# Patient Record
Sex: FEMALE | Race: WHITE | HISPANIC OR LATINO
[De-identification: names, ages, dates, MRNs, and addresses within clinical notes are randomized per-mention and may not be internally consistent; named-entity substitution may affect disease eponyms.]

---

## 2022-04-19 PROBLEM — Z00.00 ENCOUNTER FOR PREVENTIVE HEALTH EXAMINATION: Status: ACTIVE | Noted: 2022-04-19

## 2022-05-10 ENCOUNTER — APPOINTMENT (OUTPATIENT)
Dept: ANTEPARTUM | Facility: CLINIC | Age: 27
End: 2022-05-10
Payer: SELF-PAY

## 2022-05-10 ENCOUNTER — ASOB RESULT (OUTPATIENT)
Age: 27
End: 2022-05-10

## 2022-05-10 PROCEDURE — 76817 TRANSVAGINAL US OBSTETRIC: CPT

## 2022-05-10 PROCEDURE — 76811 OB US DETAILED SNGL FETUS: CPT

## 2022-06-16 ENCOUNTER — APPOINTMENT (OUTPATIENT)
Dept: PEDIATRIC CARDIOLOGY | Facility: CLINIC | Age: 27
End: 2022-06-16

## 2022-07-18 ENCOUNTER — NON-APPOINTMENT (OUTPATIENT)
Age: 27
End: 2022-07-18

## 2022-07-18 ENCOUNTER — ASOB RESULT (OUTPATIENT)
Age: 27
End: 2022-07-18

## 2022-07-18 ENCOUNTER — APPOINTMENT (OUTPATIENT)
Dept: ANTEPARTUM | Facility: CLINIC | Age: 27
End: 2022-07-18

## 2022-07-18 PROCEDURE — 76816 OB US FOLLOW-UP PER FETUS: CPT

## 2022-07-20 ENCOUNTER — APPOINTMENT (OUTPATIENT)
Dept: ANTEPARTUM | Facility: CLINIC | Age: 27
End: 2022-07-20

## 2022-08-29 ENCOUNTER — APPOINTMENT (OUTPATIENT)
Dept: ANTEPARTUM | Facility: CLINIC | Age: 27
End: 2022-08-29

## 2022-09-25 ENCOUNTER — INPATIENT (INPATIENT)
Facility: HOSPITAL | Age: 27
LOS: 5 days | Discharge: HOME | End: 2022-10-01
Attending: OBSTETRICS & GYNECOLOGY | Admitting: OBSTETRICS & GYNECOLOGY
Payer: COMMERCIAL

## 2022-09-25 VITALS — HEIGHT: 60 IN | WEIGHT: 179.9 LBS

## 2022-09-25 DIAGNOSIS — O26.899 OTHER SPECIFIED PREGNANCY RELATED CONDITIONS, UNSPECIFIED TRIMESTER: ICD-10-CM

## 2022-09-25 DIAGNOSIS — Z3A.00 WEEKS OF GESTATION OF PREGNANCY NOT SPECIFIED: ICD-10-CM

## 2022-09-25 LAB
BASOPHILS # BLD AUTO: 0.04 K/UL — SIGNIFICANT CHANGE UP (ref 0–0.2)
BASOPHILS NFR BLD AUTO: 0.3 % — SIGNIFICANT CHANGE UP (ref 0–2)
EOSINOPHIL # BLD AUTO: 0.08 K/UL — SIGNIFICANT CHANGE UP (ref 0–0.5)
EOSINOPHIL NFR BLD AUTO: 0.5 % — SIGNIFICANT CHANGE UP (ref 0–6)
HCT VFR BLD CALC: 37.1 % — SIGNIFICANT CHANGE UP (ref 34.5–45)
HGB BLD-MCNC: 12.7 G/DL — SIGNIFICANT CHANGE UP (ref 11.5–15.5)
IMM GRANULOCYTES NFR BLD AUTO: 0.7 % — SIGNIFICANT CHANGE UP (ref 0–0.9)
LYMPHOCYTES # BLD AUTO: 1.79 K/UL — SIGNIFICANT CHANGE UP (ref 1–3.3)
LYMPHOCYTES # BLD AUTO: 12.1 % — LOW (ref 13–44)
MCHC RBC-ENTMCNC: 30.9 PG — SIGNIFICANT CHANGE UP (ref 27–34)
MCHC RBC-ENTMCNC: 34.2 GM/DL — SIGNIFICANT CHANGE UP (ref 32–36)
MCV RBC AUTO: 90.3 FL — SIGNIFICANT CHANGE UP (ref 80–100)
MONOCYTES # BLD AUTO: 1.16 K/UL — HIGH (ref 0–0.9)
MONOCYTES NFR BLD AUTO: 7.8 % — SIGNIFICANT CHANGE UP (ref 2–14)
NEUTROPHILS # BLD AUTO: 11.66 K/UL — HIGH (ref 1.8–7.4)
NEUTROPHILS NFR BLD AUTO: 78.6 % — HIGH (ref 43–77)
PLATELET # BLD AUTO: 218 K/UL — SIGNIFICANT CHANGE UP (ref 150–400)
RBC # BLD: 4.11 M/UL — SIGNIFICANT CHANGE UP (ref 3.8–5.2)
RBC # FLD: 13.8 % — SIGNIFICANT CHANGE UP (ref 10.3–14.5)
WBC # BLD: 14.83 K/UL — HIGH (ref 3.8–10.5)
WBC # FLD AUTO: 14.83 K/UL — HIGH (ref 3.8–10.5)

## 2022-09-25 PROCEDURE — 94760 N-INVAS EAR/PLS OXIMETRY 1: CPT

## 2022-09-25 PROCEDURE — 36415 COLL VENOUS BLD VENIPUNCTURE: CPT

## 2022-09-25 PROCEDURE — 80053 COMPREHEN METABOLIC PANEL: CPT

## 2022-09-25 PROCEDURE — U0005: CPT

## 2022-09-25 PROCEDURE — 85027 COMPLETE CBC AUTOMATED: CPT

## 2022-09-25 PROCEDURE — 87040 BLOOD CULTURE FOR BACTERIA: CPT

## 2022-09-25 PROCEDURE — U0003: CPT

## 2022-09-25 PROCEDURE — 86780 TREPONEMA PALLIDUM: CPT

## 2022-09-25 PROCEDURE — 87150 DNA/RNA AMPLIFIED PROBE: CPT

## 2022-09-25 PROCEDURE — 87186 SC STD MICRODIL/AGAR DIL: CPT

## 2022-09-25 PROCEDURE — 86901 BLOOD TYPING SEROLOGIC RH(D): CPT

## 2022-09-25 PROCEDURE — 87086 URINE CULTURE/COLONY COUNT: CPT

## 2022-09-25 PROCEDURE — 85025 COMPLETE CBC W/AUTO DIFF WBC: CPT

## 2022-09-25 PROCEDURE — 86900 BLOOD TYPING SEROLOGIC ABO: CPT

## 2022-09-25 PROCEDURE — 59050 FETAL MONITOR W/REPORT: CPT

## 2022-09-25 PROCEDURE — 71045 X-RAY EXAM CHEST 1 VIEW: CPT

## 2022-09-25 PROCEDURE — C1889: CPT

## 2022-09-25 PROCEDURE — 86850 RBC ANTIBODY SCREEN: CPT

## 2022-09-25 PROCEDURE — 99214 OFFICE O/P EST MOD 30 MIN: CPT

## 2022-09-25 PROCEDURE — 83605 ASSAY OF LACTIC ACID: CPT

## 2022-09-25 PROCEDURE — 86769 SARS-COV-2 COVID-19 ANTIBODY: CPT

## 2022-09-25 RX ORDER — CITRIC ACID/SODIUM CITRATE 300-500 MG
30 SOLUTION, ORAL ORAL ONCE
Refills: 0 | Status: DISCONTINUED | OUTPATIENT
Start: 2022-09-25 | End: 2022-09-26

## 2022-09-25 RX ORDER — OXYTOCIN 10 UNIT/ML
VIAL (ML) INJECTION
Qty: 30 | Refills: 0 | Status: DISCONTINUED | OUTPATIENT
Start: 2022-09-25 | End: 2022-09-26

## 2022-09-25 RX ORDER — OXYTOCIN 10 UNIT/ML
333.33 VIAL (ML) INJECTION
Qty: 20 | Refills: 0 | Status: COMPLETED | OUTPATIENT
Start: 2022-09-25 | End: 2022-09-26

## 2022-09-25 RX ORDER — CHLORHEXIDINE GLUCONATE 213 G/1000ML
1 SOLUTION TOPICAL ONCE
Refills: 0 | Status: DISCONTINUED | OUTPATIENT
Start: 2022-09-25 | End: 2022-09-26

## 2022-09-25 RX ORDER — SODIUM CHLORIDE 9 MG/ML
1000 INJECTION, SOLUTION INTRAVENOUS
Refills: 0 | Status: DISCONTINUED | OUTPATIENT
Start: 2022-09-25 | End: 2022-09-26

## 2022-09-25 RX ADMIN — SODIUM CHLORIDE 125 MILLILITER(S): 9 INJECTION, SOLUTION INTRAVENOUS at 23:30

## 2022-09-25 RX ADMIN — SODIUM CHLORIDE 125 MILLILITER(S): 9 INJECTION, SOLUTION INTRAVENOUS at 23:28

## 2022-09-25 NOTE — PATIENT PROFILE OB - NS PRO TDAP RECEIVED Y/N
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 \par \par She reports chest pain associated with exertion almost daily. She states she would have chest pain soon after walking for one minutes; described the chest pain like a bandage around the chest. She states the SL nitroglycerin relieved the chest pain within one minutes; described the relief as the releasing a knot. She also reports SOB sometimes. She states the chest pain has bothered her daily life and has restricted her physical activity and daily lives.  \par \par Cardiac cath history:\par - 2014 --> s/p PTCA/TOMASZ to LAD\par - 2015 --> ISR of pLAD s/p TOMASZ with resolute stent\par - 2016 --> ISR of pLAD s/p PTCA/TOMASZ, as well as PTCA/TOMASZ to D1\par - 2016 --> PTCA of Cx. On this angiogram patient noted to have patent LM, pLAD, and pD1\par -2020 --> patent stents in dLM, pLAD,ostial D1 \par \par 3/2019- Modified Jordan stress test- 12:30 minutes- atypical left should pain, unchanged with exercise.\par 
no...

## 2022-09-25 NOTE — PATIENT PROFILE OB - FALL HARM RISK - UNIVERSAL INTERVENTIONS
Bed in lowest position, wheels locked, appropriate side rails in place/Call bell, personal items and telephone in reach/Instruct patient to call for assistance before getting out of bed or chair/Non-slip footwear when patient is out of bed/[place] to call system/Physically safe environment - no spills, clutter or unnecessary equipment/Purposeful Proactive Rounding/Room/bathroom lighting operational, light cord in reach

## 2022-09-26 LAB
ALBUMIN SERPL ELPH-MCNC: 1.9 G/DL — LOW (ref 3.3–5)
ALP SERPL-CCNC: 152 U/L — HIGH (ref 40–120)
ALT FLD-CCNC: 13 U/L — SIGNIFICANT CHANGE UP (ref 12–78)
ANION GAP SERPL CALC-SCNC: 6 MMOL/L — SIGNIFICANT CHANGE UP (ref 5–17)
AST SERPL-CCNC: 22 U/L — SIGNIFICANT CHANGE UP (ref 15–37)
BILIRUB SERPL-MCNC: 0.6 MG/DL — SIGNIFICANT CHANGE UP (ref 0.2–1.2)
BUN SERPL-MCNC: 7 MG/DL — SIGNIFICANT CHANGE UP (ref 7–23)
CALCIUM SERPL-MCNC: 8.2 MG/DL — LOW (ref 8.5–10.1)
CHLORIDE SERPL-SCNC: 108 MMOL/L — SIGNIFICANT CHANGE UP (ref 96–108)
CO2 SERPL-SCNC: 23 MMOL/L — SIGNIFICANT CHANGE UP (ref 22–31)
COVID-19 SPIKE DOMAIN AB INTERP: POSITIVE
COVID-19 SPIKE DOMAIN ANTIBODY RESULT: >250 U/ML — HIGH
CREAT SERPL-MCNC: 0.56 MG/DL — SIGNIFICANT CHANGE UP (ref 0.5–1.3)
EGFR: 127 ML/MIN/1.73M2 — SIGNIFICANT CHANGE UP
GLUCOSE SERPL-MCNC: 97 MG/DL — SIGNIFICANT CHANGE UP (ref 70–99)
HCT VFR BLD CALC: 29.9 % — LOW (ref 34.5–45)
HGB BLD-MCNC: 10.1 G/DL — LOW (ref 11.5–15.5)
LACTATE SERPL-SCNC: 1.9 MMOL/L — SIGNIFICANT CHANGE UP (ref 0.7–2)
MCHC RBC-ENTMCNC: 31.3 PG — SIGNIFICANT CHANGE UP (ref 27–34)
MCHC RBC-ENTMCNC: 33.8 GM/DL — SIGNIFICANT CHANGE UP (ref 32–36)
MCV RBC AUTO: 92.6 FL — SIGNIFICANT CHANGE UP (ref 80–100)
PLATELET # BLD AUTO: 176 K/UL — SIGNIFICANT CHANGE UP (ref 150–400)
POTASSIUM SERPL-MCNC: 3.4 MMOL/L — LOW (ref 3.5–5.3)
POTASSIUM SERPL-SCNC: 3.4 MMOL/L — LOW (ref 3.5–5.3)
PROT SERPL-MCNC: 4.8 GM/DL — LOW (ref 6–8.3)
RBC # BLD: 3.23 M/UL — LOW (ref 3.8–5.2)
RBC # FLD: 14.1 % — SIGNIFICANT CHANGE UP (ref 10.3–14.5)
SARS-COV-2 IGG+IGM SERPL QL IA: >250 U/ML — HIGH
SARS-COV-2 IGG+IGM SERPL QL IA: POSITIVE
SARS-COV-2 RNA SPEC QL NAA+PROBE: SIGNIFICANT CHANGE UP
SODIUM SERPL-SCNC: 137 MMOL/L — SIGNIFICANT CHANGE UP (ref 135–145)
T PALLIDUM AB TITR SER: NEGATIVE — SIGNIFICANT CHANGE UP
WBC # BLD: 28.84 K/UL — HIGH (ref 3.8–10.5)
WBC # FLD AUTO: 28.84 K/UL — HIGH (ref 3.8–10.5)

## 2022-09-26 PROCEDURE — 71045 X-RAY EXAM CHEST 1 VIEW: CPT | Mod: 26

## 2022-09-26 RX ORDER — MAGNESIUM HYDROXIDE 400 MG/1
30 TABLET, CHEWABLE ORAL
Refills: 0 | Status: DISCONTINUED | OUTPATIENT
Start: 2022-09-26 | End: 2022-10-01

## 2022-09-26 RX ORDER — ACETAMINOPHEN 500 MG
975 TABLET ORAL
Refills: 0 | Status: DISCONTINUED | OUTPATIENT
Start: 2022-09-26 | End: 2022-10-01

## 2022-09-26 RX ORDER — CARBOPROST TROMETHAMINE 250 UG/ML
0.25 INJECTION, SOLUTION INTRAMUSCULAR ONCE
Refills: 0 | Status: COMPLETED | OUTPATIENT
Start: 2022-09-26 | End: 2022-09-26

## 2022-09-26 RX ORDER — OXYTOCIN 10 UNIT/ML
333.33 VIAL (ML) INJECTION
Qty: 20 | Refills: 0 | Status: DISCONTINUED | OUTPATIENT
Start: 2022-09-26 | End: 2022-10-01

## 2022-09-26 RX ORDER — OXYCODONE HYDROCHLORIDE 5 MG/1
5 TABLET ORAL ONCE
Refills: 0 | Status: DISCONTINUED | OUTPATIENT
Start: 2022-09-26 | End: 2022-10-01

## 2022-09-26 RX ORDER — DIPHENHYDRAMINE HCL 50 MG
25 CAPSULE ORAL EVERY 6 HOURS
Refills: 0 | Status: DISCONTINUED | OUTPATIENT
Start: 2022-09-26 | End: 2022-10-01

## 2022-09-26 RX ORDER — AZITHROMYCIN 500 MG/1
500 TABLET, FILM COATED ORAL ONCE
Refills: 0 | Status: COMPLETED | OUTPATIENT
Start: 2022-09-26 | End: 2022-09-26

## 2022-09-26 RX ORDER — OXYCODONE HYDROCHLORIDE 5 MG/1
5 TABLET ORAL
Refills: 0 | Status: DISCONTINUED | OUTPATIENT
Start: 2022-09-26 | End: 2022-10-01

## 2022-09-26 RX ORDER — IBUPROFEN 200 MG
600 TABLET ORAL EVERY 6 HOURS
Refills: 0 | Status: DISCONTINUED | OUTPATIENT
Start: 2022-09-26 | End: 2022-10-01

## 2022-09-26 RX ORDER — MORPHINE SULFATE 50 MG/1
3 CAPSULE, EXTENDED RELEASE ORAL ONCE
Refills: 0 | Status: DISCONTINUED | OUTPATIENT
Start: 2022-09-26 | End: 2022-10-01

## 2022-09-26 RX ORDER — KETOROLAC TROMETHAMINE 30 MG/ML
30 SYRINGE (ML) INJECTION EVERY 6 HOURS
Refills: 0 | Status: DISCONTINUED | OUTPATIENT
Start: 2022-09-26 | End: 2022-09-27

## 2022-09-26 RX ORDER — GENTAMICIN SULFATE 40 MG/ML
300 VIAL (ML) INJECTION ONCE
Refills: 0 | Status: COMPLETED | OUTPATIENT
Start: 2022-09-26 | End: 2022-09-26

## 2022-09-26 RX ORDER — LANOLIN
1 OINTMENT (GRAM) TOPICAL EVERY 6 HOURS
Refills: 0 | Status: DISCONTINUED | OUTPATIENT
Start: 2022-09-26 | End: 2022-10-01

## 2022-09-26 RX ORDER — ONDANSETRON 8 MG/1
4 TABLET, FILM COATED ORAL EVERY 6 HOURS
Refills: 0 | Status: DISCONTINUED | OUTPATIENT
Start: 2022-09-26 | End: 2022-10-01

## 2022-09-26 RX ORDER — GENTAMICIN SULFATE 40 MG/ML
300 VIAL (ML) INJECTION ONCE
Refills: 0 | Status: DISCONTINUED | OUTPATIENT
Start: 2022-09-26 | End: 2022-09-26

## 2022-09-26 RX ORDER — TETANUS TOXOID, REDUCED DIPHTHERIA TOXOID AND ACELLULAR PERTUSSIS VACCINE, ADSORBED 5; 2.5; 8; 8; 2.5 [IU]/.5ML; [IU]/.5ML; UG/.5ML; UG/.5ML; UG/.5ML
0.5 SUSPENSION INTRAMUSCULAR ONCE
Refills: 0 | Status: DISCONTINUED | OUTPATIENT
Start: 2022-09-26 | End: 2022-10-01

## 2022-09-26 RX ORDER — POTASSIUM CHLORIDE 20 MEQ
20 PACKET (EA) ORAL
Refills: 0 | Status: COMPLETED | OUTPATIENT
Start: 2022-09-26 | End: 2022-09-27

## 2022-09-26 RX ORDER — ENOXAPARIN SODIUM 100 MG/ML
40 INJECTION SUBCUTANEOUS EVERY 24 HOURS
Refills: 0 | Status: DISCONTINUED | OUTPATIENT
Start: 2022-09-27 | End: 2022-10-01

## 2022-09-26 RX ORDER — NALOXONE HYDROCHLORIDE 4 MG/.1ML
0.1 SPRAY NASAL
Refills: 0 | Status: DISCONTINUED | OUTPATIENT
Start: 2022-09-26 | End: 2022-10-01

## 2022-09-26 RX ORDER — DIPHENOXYLATE HCL/ATROPINE 2.5-.025MG
1 TABLET ORAL ONCE
Refills: 0 | Status: DISCONTINUED | OUTPATIENT
Start: 2022-09-26 | End: 2022-09-26

## 2022-09-26 RX ORDER — OXYCODONE HYDROCHLORIDE 5 MG/1
10 TABLET ORAL
Refills: 0 | Status: DISCONTINUED | OUTPATIENT
Start: 2022-09-26 | End: 2022-10-01

## 2022-09-26 RX ORDER — SODIUM CHLORIDE 9 MG/ML
1000 INJECTION, SOLUTION INTRAVENOUS
Refills: 0 | Status: DISCONTINUED | OUTPATIENT
Start: 2022-09-26 | End: 2022-10-01

## 2022-09-26 RX ORDER — HYDROMORPHONE HYDROCHLORIDE 2 MG/ML
1 INJECTION INTRAMUSCULAR; INTRAVENOUS; SUBCUTANEOUS
Refills: 0 | Status: DISCONTINUED | OUTPATIENT
Start: 2022-09-26 | End: 2022-10-01

## 2022-09-26 RX ORDER — ACETAMINOPHEN 500 MG
1000 TABLET ORAL ONCE
Refills: 0 | Status: COMPLETED | OUTPATIENT
Start: 2022-09-26 | End: 2022-09-26

## 2022-09-26 RX ORDER — IBUPROFEN 200 MG
600 TABLET ORAL EVERY 6 HOURS
Refills: 0 | Status: COMPLETED | OUTPATIENT
Start: 2022-09-26 | End: 2023-08-25

## 2022-09-26 RX ORDER — SIMETHICONE 80 MG/1
80 TABLET, CHEWABLE ORAL EVERY 4 HOURS
Refills: 0 | Status: DISCONTINUED | OUTPATIENT
Start: 2022-09-26 | End: 2022-10-01

## 2022-09-26 RX ORDER — INFLUENZA VIRUS VACCINE 15; 15; 15; 15 UG/.5ML; UG/.5ML; UG/.5ML; UG/.5ML
0.5 SUSPENSION INTRAMUSCULAR ONCE
Refills: 0 | Status: DISCONTINUED | OUTPATIENT
Start: 2022-09-26 | End: 2022-10-01

## 2022-09-26 RX ORDER — GENTAMICIN SULFATE 40 MG/ML
300 VIAL (ML) INJECTION ONCE
Refills: 0 | Status: COMPLETED | OUTPATIENT
Start: 2022-09-27 | End: 2022-09-27

## 2022-09-26 RX ADMIN — Medication 1000 MILLIUNIT(S)/MIN: at 12:12

## 2022-09-26 RX ADMIN — Medication 20 MILLIEQUIVALENT(S): at 21:09

## 2022-09-26 RX ADMIN — Medication 2 MILLIUNIT(S)/MIN: at 08:46

## 2022-09-26 RX ADMIN — Medication 400 MILLIGRAM(S): at 12:00

## 2022-09-26 RX ADMIN — Medication 20 MILLIEQUIVALENT(S): at 22:49

## 2022-09-26 RX ADMIN — Medication 0.2 MILLIGRAM(S): at 18:44

## 2022-09-26 RX ADMIN — Medication 1 TABLET(S): at 11:23

## 2022-09-26 RX ADMIN — Medication 100 MILLIGRAM(S): at 18:13

## 2022-09-26 RX ADMIN — Medication 975 MILLIGRAM(S): at 21:35

## 2022-09-26 RX ADMIN — SODIUM CHLORIDE 125 MILLILITER(S): 9 INJECTION, SOLUTION INTRAVENOUS at 00:02

## 2022-09-26 RX ADMIN — SODIUM CHLORIDE 125 MILLILITER(S): 9 INJECTION, SOLUTION INTRAVENOUS at 07:02

## 2022-09-26 RX ADMIN — AZITHROMYCIN 255 MILLIGRAM(S): 500 TABLET, FILM COATED ORAL at 10:15

## 2022-09-26 RX ADMIN — Medication 1000 MILLIGRAM(S): at 12:21

## 2022-09-26 RX ADMIN — Medication 30 MILLIGRAM(S): at 18:12

## 2022-09-26 RX ADMIN — Medication 100 MILLIGRAM(S): at 12:00

## 2022-09-26 RX ADMIN — CARBOPROST TROMETHAMINE 0.25 MICROGRAM(S): 250 INJECTION, SOLUTION INTRAMUSCULAR at 11:22

## 2022-09-26 RX ADMIN — Medication 0.2 MILLIGRAM(S): at 11:16

## 2022-09-26 RX ADMIN — SODIUM CHLORIDE 125 MILLILITER(S): 9 INJECTION, SOLUTION INTRAVENOUS at 03:02

## 2022-09-26 RX ADMIN — Medication 100 MILLIGRAM(S): at 10:15

## 2022-09-26 RX ADMIN — Medication 975 MILLIGRAM(S): at 21:08

## 2022-09-26 NOTE — OB POSTPARTUM EVENT NOTE - NS_EVENTPTSUMMARY1_OBGYN_ALL_OB_FT
spO2 88% on room air. T 39.3, HR 97, /74, RR 22. lung sounds clear bilaterally throughout lung field. pt denies chest pain & SOB.

## 2022-09-26 NOTE — OB POSTPARTUM EVENT NOTE - NS_EVENTFINDINGS1_OBGYN_ALL_OB_FT
pt to have CXR, blood cultures, urine culture, lactate. pt put on 3L O2 via nasal cannula. pulse ox to be monitored continuously

## 2022-09-27 LAB
ALBUMIN SERPL ELPH-MCNC: 1.9 G/DL — LOW (ref 3.3–5)
ALP SERPL-CCNC: 136 U/L — HIGH (ref 40–120)
ALT FLD-CCNC: 15 U/L — SIGNIFICANT CHANGE UP (ref 12–78)
ANION GAP SERPL CALC-SCNC: 6 MMOL/L — SIGNIFICANT CHANGE UP (ref 5–17)
AST SERPL-CCNC: 28 U/L — SIGNIFICANT CHANGE UP (ref 15–37)
BASOPHILS # BLD AUTO: 0.05 K/UL — SIGNIFICANT CHANGE UP (ref 0–0.2)
BASOPHILS NFR BLD AUTO: 0.2 % — SIGNIFICANT CHANGE UP (ref 0–2)
BILIRUB SERPL-MCNC: 0.4 MG/DL — SIGNIFICANT CHANGE UP (ref 0.2–1.2)
BUN SERPL-MCNC: 9 MG/DL — SIGNIFICANT CHANGE UP (ref 7–23)
CALCIUM SERPL-MCNC: 8.5 MG/DL — SIGNIFICANT CHANGE UP (ref 8.5–10.1)
CHLORIDE SERPL-SCNC: 109 MMOL/L — HIGH (ref 96–108)
CO2 SERPL-SCNC: 24 MMOL/L — SIGNIFICANT CHANGE UP (ref 22–31)
COVID-19 SPIKE DOMAIN AB INTERP: POSITIVE
COVID-19 SPIKE DOMAIN ANTIBODY RESULT: >250 U/ML — HIGH
CREAT SERPL-MCNC: 0.49 MG/DL — LOW (ref 0.5–1.3)
EGFR: 131 ML/MIN/1.73M2 — SIGNIFICANT CHANGE UP
EOSINOPHIL # BLD AUTO: 0.13 K/UL — SIGNIFICANT CHANGE UP (ref 0–0.5)
EOSINOPHIL NFR BLD AUTO: 0.5 % — SIGNIFICANT CHANGE UP (ref 0–6)
GLUCOSE SERPL-MCNC: 59 MG/DL — LOW (ref 70–99)
HCT VFR BLD CALC: 28.2 % — LOW (ref 34.5–45)
HGB BLD-MCNC: 9.4 G/DL — LOW (ref 11.5–15.5)
IMM GRANULOCYTES NFR BLD AUTO: 1.3 % — HIGH (ref 0–0.9)
LACTATE SERPL-SCNC: 0.8 MMOL/L — SIGNIFICANT CHANGE UP (ref 0.7–2)
LYMPHOCYTES # BLD AUTO: 1.48 K/UL — SIGNIFICANT CHANGE UP (ref 1–3.3)
LYMPHOCYTES # BLD AUTO: 6.2 % — LOW (ref 13–44)
MCHC RBC-ENTMCNC: 31.3 PG — SIGNIFICANT CHANGE UP (ref 27–34)
MCHC RBC-ENTMCNC: 33.3 GM/DL — SIGNIFICANT CHANGE UP (ref 32–36)
MCV RBC AUTO: 94 FL — SIGNIFICANT CHANGE UP (ref 80–100)
MONOCYTES # BLD AUTO: 2.15 K/UL — HIGH (ref 0–0.9)
MONOCYTES NFR BLD AUTO: 9 % — SIGNIFICANT CHANGE UP (ref 2–14)
NEUTROPHILS # BLD AUTO: 19.81 K/UL — HIGH (ref 1.8–7.4)
NEUTROPHILS NFR BLD AUTO: 82.8 % — HIGH (ref 43–77)
PLATELET # BLD AUTO: 178 K/UL — SIGNIFICANT CHANGE UP (ref 150–400)
POTASSIUM SERPL-MCNC: 4.2 MMOL/L — SIGNIFICANT CHANGE UP (ref 3.5–5.3)
POTASSIUM SERPL-SCNC: 4.2 MMOL/L — SIGNIFICANT CHANGE UP (ref 3.5–5.3)
PROT SERPL-MCNC: 5.1 GM/DL — LOW (ref 6–8.3)
RBC # BLD: 3 M/UL — LOW (ref 3.8–5.2)
RBC # FLD: 14.4 % — SIGNIFICANT CHANGE UP (ref 10.3–14.5)
SARS-COV-2 IGG+IGM SERPL QL IA: >250 U/ML — HIGH
SARS-COV-2 IGG+IGM SERPL QL IA: POSITIVE
SODIUM SERPL-SCNC: 139 MMOL/L — SIGNIFICANT CHANGE UP (ref 135–145)
WBC # BLD: 23.94 K/UL — HIGH (ref 3.8–10.5)
WBC # FLD AUTO: 23.94 K/UL — HIGH (ref 3.8–10.5)

## 2022-09-27 RX ADMIN — MAGNESIUM HYDROXIDE 30 MILLILITER(S): 400 TABLET, CHEWABLE ORAL at 22:18

## 2022-09-27 RX ADMIN — Medication 975 MILLIGRAM(S): at 23:00

## 2022-09-27 RX ADMIN — Medication 600 MILLIGRAM(S): at 06:29

## 2022-09-27 RX ADMIN — Medication 1 TABLET(S): at 22:13

## 2022-09-27 RX ADMIN — SIMETHICONE 80 MILLIGRAM(S): 80 TABLET, CHEWABLE ORAL at 12:10

## 2022-09-27 RX ADMIN — Medication 975 MILLIGRAM(S): at 16:10

## 2022-09-27 RX ADMIN — Medication 0.2 MILLIGRAM(S): at 02:47

## 2022-09-27 RX ADMIN — Medication 100 MILLIGRAM(S): at 02:48

## 2022-09-27 RX ADMIN — Medication 975 MILLIGRAM(S): at 09:25

## 2022-09-27 RX ADMIN — SIMETHICONE 80 MILLIGRAM(S): 80 TABLET, CHEWABLE ORAL at 22:18

## 2022-09-27 RX ADMIN — Medication 100 MILLIGRAM(S): at 02:26

## 2022-09-27 RX ADMIN — Medication 0.2 MILLIGRAM(S): at 10:52

## 2022-09-27 RX ADMIN — Medication 600 MILLIGRAM(S): at 18:17

## 2022-09-27 RX ADMIN — Medication 600 MILLIGRAM(S): at 00:13

## 2022-09-27 RX ADMIN — Medication 975 MILLIGRAM(S): at 09:50

## 2022-09-27 RX ADMIN — Medication 100 MILLIGRAM(S): at 12:09

## 2022-09-27 RX ADMIN — Medication 600 MILLIGRAM(S): at 07:00

## 2022-09-27 RX ADMIN — Medication 975 MILLIGRAM(S): at 03:18

## 2022-09-27 RX ADMIN — Medication 20 MILLIEQUIVALENT(S): at 00:38

## 2022-09-27 RX ADMIN — Medication 975 MILLIGRAM(S): at 02:47

## 2022-09-27 RX ADMIN — Medication 600 MILLIGRAM(S): at 12:10

## 2022-09-27 RX ADMIN — Medication 975 MILLIGRAM(S): at 15:19

## 2022-09-27 RX ADMIN — Medication 600 MILLIGRAM(S): at 12:25

## 2022-09-27 RX ADMIN — Medication 975 MILLIGRAM(S): at 22:13

## 2022-09-27 RX ADMIN — Medication 600 MILLIGRAM(S): at 00:42

## 2022-09-27 RX ADMIN — ENOXAPARIN SODIUM 40 MILLIGRAM(S): 100 INJECTION SUBCUTANEOUS at 12:10

## 2022-09-28 LAB
-  AMIKACIN: SIGNIFICANT CHANGE UP
-  AMOXICILLIN/CLAVULANIC ACID: SIGNIFICANT CHANGE UP
-  AMPICILLIN/SULBACTAM: SIGNIFICANT CHANGE UP
-  AMPICILLIN: SIGNIFICANT CHANGE UP
-  AZTREONAM: SIGNIFICANT CHANGE UP
-  CEFAZOLIN: SIGNIFICANT CHANGE UP
-  CEFEPIME: SIGNIFICANT CHANGE UP
-  CEFOXITIN: SIGNIFICANT CHANGE UP
-  CEFTRIAXONE: SIGNIFICANT CHANGE UP
-  CIPROFLOXACIN: SIGNIFICANT CHANGE UP
-  ERTAPENEM: SIGNIFICANT CHANGE UP
-  GENTAMICIN: SIGNIFICANT CHANGE UP
-  IMIPENEM: SIGNIFICANT CHANGE UP
-  LEVOFLOXACIN: SIGNIFICANT CHANGE UP
-  MEROPENEM: SIGNIFICANT CHANGE UP
-  NITROFURANTOIN: SIGNIFICANT CHANGE UP
-  PIPERACILLIN/TAZOBACTAM: SIGNIFICANT CHANGE UP
-  TIGECYCLINE: SIGNIFICANT CHANGE UP
-  TOBRAMYCIN: SIGNIFICANT CHANGE UP
-  TRIMETHOPRIM/SULFAMETHOXAZOLE: SIGNIFICANT CHANGE UP
CULTURE RESULTS: SIGNIFICANT CHANGE UP
E COLI DNA BLD POS QL NAA+NON-PROBE: SIGNIFICANT CHANGE UP
GRAM STN FLD: SIGNIFICANT CHANGE UP
HCT VFR BLD CALC: 26.1 % — LOW (ref 34.5–45)
HGB BLD-MCNC: 8.6 G/DL — LOW (ref 11.5–15.5)
MCHC RBC-ENTMCNC: 30.5 PG — SIGNIFICANT CHANGE UP (ref 27–34)
MCHC RBC-ENTMCNC: 33 GM/DL — SIGNIFICANT CHANGE UP (ref 32–36)
MCV RBC AUTO: 92.6 FL — SIGNIFICANT CHANGE UP (ref 80–100)
METHOD TYPE: SIGNIFICANT CHANGE UP
METHOD TYPE: SIGNIFICANT CHANGE UP
ORGANISM # SPEC MICROSCOPIC CNT: SIGNIFICANT CHANGE UP
ORGANISM # SPEC MICROSCOPIC CNT: SIGNIFICANT CHANGE UP
PLATELET # BLD AUTO: 193 K/UL — SIGNIFICANT CHANGE UP (ref 150–400)
RBC # BLD: 2.82 M/UL — LOW (ref 3.8–5.2)
RBC # FLD: 14.1 % — SIGNIFICANT CHANGE UP (ref 10.3–14.5)
SPECIMEN SOURCE: SIGNIFICANT CHANGE UP
WBC # BLD: 11.98 K/UL — HIGH (ref 3.8–10.5)
WBC # FLD AUTO: 11.98 K/UL — HIGH (ref 3.8–10.5)

## 2022-09-28 RX ORDER — FERROUS SULFATE 325(65) MG
325 TABLET ORAL
Refills: 0 | Status: DISCONTINUED | OUTPATIENT
Start: 2022-09-28 | End: 2022-10-01

## 2022-09-28 RX ORDER — MAGNESIUM HYDROXIDE 400 MG/1
45 TABLET, CHEWABLE ORAL ONCE
Refills: 0 | Status: COMPLETED | OUTPATIENT
Start: 2022-09-28 | End: 2022-09-28

## 2022-09-28 RX ORDER — NITROFURANTOIN MACROCRYSTAL 50 MG
100 CAPSULE ORAL
Refills: 0 | Status: DISCONTINUED | OUTPATIENT
Start: 2022-09-28 | End: 2022-09-28

## 2022-09-28 RX ORDER — CEFTRIAXONE 500 MG/1
1000 INJECTION, POWDER, FOR SOLUTION INTRAMUSCULAR; INTRAVENOUS EVERY 24 HOURS
Refills: 0 | Status: DISCONTINUED | OUTPATIENT
Start: 2022-09-29 | End: 2022-09-29

## 2022-09-28 RX ORDER — CEFTRIAXONE 500 MG/1
INJECTION, POWDER, FOR SOLUTION INTRAMUSCULAR; INTRAVENOUS
Refills: 0 | Status: DISCONTINUED | OUTPATIENT
Start: 2022-09-28 | End: 2022-09-29

## 2022-09-28 RX ORDER — CEFTRIAXONE 500 MG/1
1000 INJECTION, POWDER, FOR SOLUTION INTRAMUSCULAR; INTRAVENOUS ONCE
Refills: 0 | Status: COMPLETED | OUTPATIENT
Start: 2022-09-28 | End: 2022-09-28

## 2022-09-28 RX ADMIN — Medication 600 MILLIGRAM(S): at 01:00

## 2022-09-28 RX ADMIN — Medication 600 MILLIGRAM(S): at 18:51

## 2022-09-28 RX ADMIN — Medication 600 MILLIGRAM(S): at 17:33

## 2022-09-28 RX ADMIN — MAGNESIUM HYDROXIDE 45 MILLILITER(S): 400 TABLET, CHEWABLE ORAL at 12:09

## 2022-09-28 RX ADMIN — Medication 975 MILLIGRAM(S): at 04:30

## 2022-09-28 RX ADMIN — Medication 975 MILLIGRAM(S): at 03:40

## 2022-09-28 RX ADMIN — Medication 975 MILLIGRAM(S): at 09:17

## 2022-09-28 RX ADMIN — Medication 975 MILLIGRAM(S): at 15:27

## 2022-09-28 RX ADMIN — Medication 1 TABLET(S): at 17:33

## 2022-09-28 RX ADMIN — Medication 600 MILLIGRAM(S): at 07:30

## 2022-09-28 RX ADMIN — Medication 600 MILLIGRAM(S): at 12:10

## 2022-09-28 RX ADMIN — Medication 975 MILLIGRAM(S): at 22:10

## 2022-09-28 RX ADMIN — Medication 1 TABLET(S): at 06:51

## 2022-09-28 RX ADMIN — Medication 975 MILLIGRAM(S): at 16:28

## 2022-09-28 RX ADMIN — CEFTRIAXONE 100 MILLIGRAM(S): 500 INJECTION, POWDER, FOR SOLUTION INTRAMUSCULAR; INTRAVENOUS at 21:40

## 2022-09-28 RX ADMIN — Medication 600 MILLIGRAM(S): at 00:15

## 2022-09-28 RX ADMIN — Medication 600 MILLIGRAM(S): at 12:19

## 2022-09-28 RX ADMIN — Medication 600 MILLIGRAM(S): at 06:51

## 2022-09-28 RX ADMIN — Medication 975 MILLIGRAM(S): at 21:15

## 2022-09-28 RX ADMIN — ENOXAPARIN SODIUM 40 MILLIGRAM(S): 100 INJECTION SUBCUTANEOUS at 12:12

## 2022-09-28 RX ADMIN — Medication 975 MILLIGRAM(S): at 10:58

## 2022-09-28 NOTE — PHARMACOTHERAPY INTERVENTION NOTE - COMMENTS
Recommended escalating from amoxicillin-clavulanate to ceftriaxone 1g IV q24h in the setting of E. coli bacteremia.

## 2022-09-29 LAB
HCT VFR BLD CALC: 26.3 % — LOW (ref 34.5–45)
HGB BLD-MCNC: 8.7 G/DL — LOW (ref 11.5–15.5)
MCHC RBC-ENTMCNC: 30.9 PG — SIGNIFICANT CHANGE UP (ref 27–34)
MCHC RBC-ENTMCNC: 33.1 GM/DL — SIGNIFICANT CHANGE UP (ref 32–36)
MCV RBC AUTO: 93.3 FL — SIGNIFICANT CHANGE UP (ref 80–100)
PLATELET # BLD AUTO: 279 K/UL — SIGNIFICANT CHANGE UP (ref 150–400)
RBC # BLD: 2.82 M/UL — LOW (ref 3.8–5.2)
RBC # FLD: 13.9 % — SIGNIFICANT CHANGE UP (ref 10.3–14.5)
WBC # BLD: 7.57 K/UL — SIGNIFICANT CHANGE UP (ref 3.8–10.5)
WBC # FLD AUTO: 7.57 K/UL — SIGNIFICANT CHANGE UP (ref 3.8–10.5)

## 2022-09-29 RX ORDER — AMPICILLIN SODIUM AND SULBACTAM SODIUM 250; 125 MG/ML; MG/ML
3 INJECTION, POWDER, FOR SUSPENSION INTRAMUSCULAR; INTRAVENOUS EVERY 6 HOURS
Refills: 0 | Status: DISCONTINUED | OUTPATIENT
Start: 2022-09-29 | End: 2022-10-01

## 2022-09-29 RX ORDER — AMPICILLIN SODIUM AND SULBACTAM SODIUM 250; 125 MG/ML; MG/ML
INJECTION, POWDER, FOR SUSPENSION INTRAMUSCULAR; INTRAVENOUS
Refills: 0 | Status: DISCONTINUED | OUTPATIENT
Start: 2022-09-29 | End: 2022-10-01

## 2022-09-29 RX ORDER — AMPICILLIN SODIUM AND SULBACTAM SODIUM 250; 125 MG/ML; MG/ML
3 INJECTION, POWDER, FOR SUSPENSION INTRAMUSCULAR; INTRAVENOUS ONCE
Refills: 0 | Status: COMPLETED | OUTPATIENT
Start: 2022-09-29 | End: 2022-09-29

## 2022-09-29 RX ADMIN — Medication 600 MILLIGRAM(S): at 00:26

## 2022-09-29 RX ADMIN — Medication 600 MILLIGRAM(S): at 01:20

## 2022-09-29 RX ADMIN — Medication 600 MILLIGRAM(S): at 12:25

## 2022-09-29 RX ADMIN — Medication 975 MILLIGRAM(S): at 03:32

## 2022-09-29 RX ADMIN — ENOXAPARIN SODIUM 40 MILLIGRAM(S): 100 INJECTION SUBCUTANEOUS at 16:57

## 2022-09-29 RX ADMIN — Medication 600 MILLIGRAM(S): at 13:15

## 2022-09-29 RX ADMIN — AMPICILLIN SODIUM AND SULBACTAM SODIUM 200 GRAM(S): 250; 125 INJECTION, POWDER, FOR SUSPENSION INTRAMUSCULAR; INTRAVENOUS at 16:55

## 2022-09-29 RX ADMIN — Medication 325 MILLIGRAM(S): at 22:56

## 2022-09-29 RX ADMIN — Medication 325 MILLIGRAM(S): at 12:25

## 2022-09-29 RX ADMIN — Medication 325 MILLIGRAM(S): at 00:26

## 2022-09-29 RX ADMIN — Medication 600 MILLIGRAM(S): at 07:14

## 2022-09-29 RX ADMIN — AMPICILLIN SODIUM AND SULBACTAM SODIUM 200 GRAM(S): 250; 125 INJECTION, POWDER, FOR SUSPENSION INTRAMUSCULAR; INTRAVENOUS at 10:46

## 2022-09-29 RX ADMIN — Medication 975 MILLIGRAM(S): at 04:30

## 2022-09-29 NOTE — CONSULT NOTE ADULT - SUBJECTIVE AND OBJECTIVE BOX
Patient is a 27y old  Female who presents with a chief complaint of delivery    HPI:  26 y/o woman, pregnant was admitted on  for delivery. On  she underwent a  and received clindamycin, gentamicin and azithromycin. She is reported with bacteremia.     PMH: as above  PSH: as above  Meds: per reconciliation sheet, noted below  MEDICATIONS  (STANDING):  acetaminophen     Tablet .. 975 milliGRAM(s) Oral <User Schedule>  cefTRIAXone   IVPB      cefTRIAXone   IVPB 1000 milliGRAM(s) IV Intermittent every 24 hours  dextrose 5% + lactated ringers. 1000 milliLiter(s) (125 mL/Hr) IV Continuous <Continuous>  diphtheria/tetanus/pertussis (acellular) Vaccine (ADAcel) 0.5 milliLiter(s) IntraMuscular once  enoxaparin Injectable 40 milliGRAM(s) SubCutaneous every 24 hours  ferrous    sulfate 325 milliGRAM(s) Oral two times a day  ibuprofen  Tablet. 600 milliGRAM(s) Oral every 6 hours  influenza   Vaccine 0.5 milliLiter(s) IntraMuscular once  lactated ringers. 1000 milliLiter(s) (125 mL/Hr) IV Continuous <Continuous>  measles/mumps/rubella Vaccine 0.5 milliLiter(s) SubCutaneous once  morphine PF Epidural 3 milliGRAM(s) Epidural once  oxytocin Infusion 333.333 milliUNIT(s)/Min (1000 mL/Hr) IV Continuous <Continuous>    MEDICATIONS  (PRN):  diphenhydrAMINE 25 milliGRAM(s) Oral every 6 hours PRN Pruritus  HYDROmorphone  Injectable 1 milliGRAM(s) IV Push every 3 hours PRN Severe Pain (7 - 10)  lanolin Ointment 1 Application(s) Topical every 6 hours PRN Sore Nipples  magnesium hydroxide Suspension 30 milliLiter(s) Oral two times a day PRN Constipation  naloxone Injectable 0.1 milliGRAM(s) IV Push every 3 minutes PRN For ANY of the following changes in patient status:  A. RR LESS THAN 10 breaths per minute, B. Oxygen saturation LESS THAN 90%, C. Sedation score of 6  ondansetron Injectable 4 milliGRAM(s) IV Push every 6 hours PRN Nausea  oxyCODONE    IR 5 milliGRAM(s) Oral every 3 hours PRN Moderate to Severe Pain (4-10)  oxyCODONE    IR 5 milliGRAM(s) Oral once PRN Moderate to Severe Pain (4-10)  oxyCODONE    IR 5 milliGRAM(s) Oral every 3 hours PRN Mild Pain (1 - 3)  oxyCODONE    IR 10 milliGRAM(s) Oral every 3 hours PRN Moderate Pain (4 - 6)  simethicone 80 milliGRAM(s) Chew every 4 hours PRN Gas    Allergies    No Known Allergies    Intolerances      Social: no smoking, no alcohol, no illegal drugs; no recent travel, no exposure to TB  FAMILY HISTORY:    no history of premature cardiovascular disease in first degree relatives    ROS: the patient denies fever, no chills, no HA, no seizures, no dizziness, no sore throat, no nasal congestion, no blurry vision, no CP, no palpitations, no SOB, no cough, mild lower abdominal pain, no diarrhea, no N/V, no dysuria, no leg pain, no claudication, no rash, no joint aches, no rectal pain or bleeding, no night sweats  All other systems reviewed and are negative    Vital Signs Last 24 Hrs  T(C): 36.7 (29 Sep 2022 05:30), Max: 37 (28 Sep 2022 19:15)  T(F): 98 (29 Sep 2022 05:30), Max: 98.6 (28 Sep 2022 19:15)  HR: 77 (29 Sep 2022 05:30) (77 - 93)  BP: 108/70 (29 Sep 2022 05:30) (107/65 - 111/67)  BP(mean): --  RR: 17 (29 Sep 2022 05:30) (16 - 17)  SpO2: 99% (29 Sep 2022 05:30) (99% - 100%)    Parameters below as of 29 Sep 2022 05:30  Patient On (Oxygen Delivery Method): room air    PE:    Constitutional:  No acute distress  HEENT: NC/AT, EOMI, PERRLA, conjunctivae clear; ears and nose atraumatic; pharynx benign  Neck: supple; thyroid not palpable  Back: no tenderness  Respiratory: respiratory effort normal; clear to auscultation  Cardiovascular: S1S2 regular, no murmurs  Abdomen: soft, not tender, mild distended, positive BS; no liver or spleen organomegaly  Genitourinary: no suprapubic tenderness  Lymphatic: no LN palpable  Musculoskeletal: no muscle tenderness, no joint swelling or tenderness  Extremities: no pedal edema  Neurological/ Psychiatric: AxOx3, judgement and insight normal; moving all extremities  Skin: no rashes; no palpable lesions    Labs: all available labs reviewed                        8.7    7.57  )-----------( 279      ( 29 Sep 2022 07:55 )             26.3     COVID-19 PCR: NotDetec (22 @ 21:50)      Culture - Blood (collected 26 Sep 2022 12:55)  Source: .Blood None  Gram Stain (28 Sep 2022 15:21):    Growth in anaerobic bottle: Gram Negative Rods  Preliminary Report (28 Sep 2022 15:21):    Growth in anaerobic bottle: Gram Negative Rods  Organism: Blood Culture PCR (28 Sep 2022 17:55)      -  Escherichia coli: Detec      Method Type: PCR    Culture - Urine (collected 26 Sep 2022 12:50)  Source: Catheterized Catheterized  Final Report (28 Sep 2022 17:15):    >100,000 CFU/ml Escherichia coli  Organism: Escherichia coli (28 Sep 2022 17:15)  Organism: Escherichia coli (28 Sep 2022 17:15)      -  Amikacin: S <=16      -  Amoxicillin/Clavulanic Acid: S <=8/4      -  Ampicillin: S <=8 These ampicillin results predict results for amoxicillin      -  Ampicillin/Sulbactam: S <=4/2 Enterobacter, Klebsiella aerogenes, Citrobacter, and Serratia may develop resistance during prolonged therapy (3-4 days)      -  Aztreonam: S <=4      -  Cefazolin: S <=2 For uncomplicated UTI with K. pneumoniae, E. coli, or P. mirablis: DYLAN <=16 is sensitive and DYLAN >=32 is resistant. This also predicts results for oral agents cefaclor, cefdinir, cefpodoxime, cefprozil, cefuroxime axetil, cephalexin and locarbef for uncomplicated UTI. Note that some isolates may be susceptible to these agents while testing resistant to cefazolin.      -  Cefepime: S <=2      -  Cefoxitin: S <=8      -  Ceftriaxone: S <=1 Enterobacter, Klebsiella aerogenes, Citrobacter, and Serratia may develop resistance during prolonged therapy      -  Ciprofloxacin: R >2      -  Ertapenem: S <=0.5      -  Gentamicin: S <=2      -  Imipenem: S <=1      -  Levofloxacin: R >4      -  Meropenem: S <=1      -  Nitrofurantoin: S <=32 Should not be used to treat pyelonephritis      -  Piperacillin/Tazobactam: S <=8      -  Tigecycline: S <=2      -  Tobramycin: S <=2      -  Trimethoprim/Sulfamethoxazole: R >      Method Type: DYLAN    Radiology: all available radiological tests reviewed    Advanced directives addressed: full resuscitation

## 2022-09-29 NOTE — CONSULT NOTE ADULT - ASSESSMENT
28 y/o woman, pregnant was admitted on  for delivery. On  she underwent a  and received clindamycin, gentamicin and azithromycin. She is reported with bacteremia.     1. Sepsis with E.coli. UTI with E.coli. Probable chorioamnionitis. .  -cultures reviewed  -s/p clindamycin, gentamicin and azithromycin  -s/p augmentin PO  -on ceftriaxone 1 gm IV qd  -start unasyn 3 gm IV q6h  -reason for abx use and side effects reviewed with patient; monitor BMP  -monitor abdomen  -old chart reviewed to assess prior cultures  -monitor temps  -f/u CBC  -supportive care  2. Other issues:   -care per medicine   26 y/o woman, pregnant was admitted on  for delivery. On  she underwent a  and received clindamycin, gentamicin and azithromycin. She is reported with bacteremia.     1. Sepsis with E.coli. UTI with E.coli. Probable chorioamnionitis. .  -cultures reviewed  -s/p clindamycin, gentamicin and azithromycin  -s/p augmentin PO  -on ceftriaxone 1 gm IV qd  -start unasyn 3 gm IV q6h  -reason for abx use and side effects reviewed with patient; monitor BMP  -monitor abdomen  -old chart reviewed to assess prior cultures  -monitor temps  -f/u CBC  -supportive care  2. Other issues:   -care per medicine    d/w  at bedside

## 2022-09-30 LAB
-  AMIKACIN: SIGNIFICANT CHANGE UP
-  AMPICILLIN/SULBACTAM: SIGNIFICANT CHANGE UP
-  AMPICILLIN: SIGNIFICANT CHANGE UP
-  AZTREONAM: SIGNIFICANT CHANGE UP
-  CEFAZOLIN: SIGNIFICANT CHANGE UP
-  CEFEPIME: SIGNIFICANT CHANGE UP
-  CEFOXITIN: SIGNIFICANT CHANGE UP
-  CEFTRIAXONE: SIGNIFICANT CHANGE UP
-  CIPROFLOXACIN: SIGNIFICANT CHANGE UP
-  ERTAPENEM: SIGNIFICANT CHANGE UP
-  GENTAMICIN: SIGNIFICANT CHANGE UP
-  IMIPENEM: SIGNIFICANT CHANGE UP
-  LEVOFLOXACIN: SIGNIFICANT CHANGE UP
-  MEROPENEM: SIGNIFICANT CHANGE UP
-  PIPERACILLIN/TAZOBACTAM: SIGNIFICANT CHANGE UP
-  TOBRAMYCIN: SIGNIFICANT CHANGE UP
-  TRIMETHOPRIM/SULFAMETHOXAZOLE: SIGNIFICANT CHANGE UP
CULTURE RESULTS: SIGNIFICANT CHANGE UP
METHOD TYPE: SIGNIFICANT CHANGE UP
ORGANISM # SPEC MICROSCOPIC CNT: SIGNIFICANT CHANGE UP
SPECIMEN SOURCE: SIGNIFICANT CHANGE UP

## 2022-09-30 RX ADMIN — AMPICILLIN SODIUM AND SULBACTAM SODIUM 200 GRAM(S): 250; 125 INJECTION, POWDER, FOR SUSPENSION INTRAMUSCULAR; INTRAVENOUS at 13:40

## 2022-09-30 RX ADMIN — Medication 600 MILLIGRAM(S): at 18:49

## 2022-09-30 RX ADMIN — Medication 325 MILLIGRAM(S): at 22:00

## 2022-09-30 RX ADMIN — Medication 600 MILLIGRAM(S): at 00:39

## 2022-09-30 RX ADMIN — Medication 975 MILLIGRAM(S): at 21:58

## 2022-09-30 RX ADMIN — AMPICILLIN SODIUM AND SULBACTAM SODIUM 200 GRAM(S): 250; 125 INJECTION, POWDER, FOR SUSPENSION INTRAMUSCULAR; INTRAVENOUS at 06:50

## 2022-09-30 RX ADMIN — ENOXAPARIN SODIUM 40 MILLIGRAM(S): 100 INJECTION SUBCUTANEOUS at 18:49

## 2022-09-30 RX ADMIN — Medication 600 MILLIGRAM(S): at 01:40

## 2022-09-30 RX ADMIN — AMPICILLIN SODIUM AND SULBACTAM SODIUM 200 GRAM(S): 250; 125 INJECTION, POWDER, FOR SUSPENSION INTRAMUSCULAR; INTRAVENOUS at 18:49

## 2022-09-30 RX ADMIN — AMPICILLIN SODIUM AND SULBACTAM SODIUM 200 GRAM(S): 250; 125 INJECTION, POWDER, FOR SUSPENSION INTRAMUSCULAR; INTRAVENOUS at 00:39

## 2022-09-30 RX ADMIN — Medication 600 MILLIGRAM(S): at 07:00

## 2022-09-30 RX ADMIN — Medication 600 MILLIGRAM(S): at 06:49

## 2022-09-30 NOTE — PROGRESS NOTE ADULT - ASSESSMENT
26 y/o woman, pregnant was admitted on  for delivery. On  she underwent a  and received clindamycin, gentamicin and azithromycin. She is reported with bacteremia.     1. Sepsis with E.coli. UTI with E.coli. Probable chorioamnionitis. .  -cultures reviewed  -s/p clindamycin, gentamicin and azithromycin  -s/p augmentin PO  -s/p ceftriaxone 1 gm IV qd  -on unasyn 3 gm IV q6h # 2  -tolerating abx well so far; no side effects noted  -monitor abdomen  -continue IV abx therapy for 1-2 more days  -plan to change to augmentin 875 mg PO q12h to complete 14 days of abx therapy when ready for discharge   -monitor temps  -f/u CBC  -supportive care  2. Other issues:   -care per medicine    d/w  at bedside

## 2022-10-01 ENCOUNTER — TRANSCRIPTION ENCOUNTER (OUTPATIENT)
Age: 27
End: 2022-10-01

## 2022-10-01 VITALS
OXYGEN SATURATION: 99 % | TEMPERATURE: 99 F | HEART RATE: 73 BPM | RESPIRATION RATE: 17 BRPM | SYSTOLIC BLOOD PRESSURE: 96 MMHG | DIASTOLIC BLOOD PRESSURE: 59 MMHG

## 2022-10-01 RX ORDER — ACETAMINOPHEN 500 MG
3 TABLET ORAL
Qty: 0 | Refills: 0 | DISCHARGE
Start: 2022-10-01

## 2022-10-01 RX ORDER — FERROUS SULFATE 325(65) MG
1 TABLET ORAL
Qty: 0 | Refills: 0 | DISCHARGE
Start: 2022-10-01

## 2022-10-01 RX ORDER — IBUPROFEN 200 MG
1 TABLET ORAL
Qty: 0 | Refills: 0 | DISCHARGE
Start: 2022-10-01

## 2022-10-01 RX ADMIN — AMPICILLIN SODIUM AND SULBACTAM SODIUM 200 GRAM(S): 250; 125 INJECTION, POWDER, FOR SUSPENSION INTRAMUSCULAR; INTRAVENOUS at 13:00

## 2022-10-01 RX ADMIN — AMPICILLIN SODIUM AND SULBACTAM SODIUM 200 GRAM(S): 250; 125 INJECTION, POWDER, FOR SUSPENSION INTRAMUSCULAR; INTRAVENOUS at 00:48

## 2022-10-01 RX ADMIN — Medication 325 MILLIGRAM(S): at 13:00

## 2022-10-01 RX ADMIN — Medication 600 MILLIGRAM(S): at 06:27

## 2022-10-01 RX ADMIN — Medication 600 MILLIGRAM(S): at 00:48

## 2022-10-01 RX ADMIN — Medication 600 MILLIGRAM(S): at 12:59

## 2022-10-01 RX ADMIN — AMPICILLIN SODIUM AND SULBACTAM SODIUM 200 GRAM(S): 250; 125 INJECTION, POWDER, FOR SUSPENSION INTRAMUSCULAR; INTRAVENOUS at 06:27

## 2022-10-01 NOTE — DISCHARGE NOTE OB - HOSPITAL COURSE
Patient is a  27y woman  who is S/P  for nonreassuring FHR. Post op course is complicated by Uterine atony and Chorioamnionitis during labor. Blood cultures showed e-coli bacteriemia and Infectious disease was consulted. Patient was seen by Dr. Rasmussen who recommended Ceftriaxone IV x 3days and to transition to oral Augmentin 875 q12 x 14 day before discharge. Patient remained afebrile and hemodynamically stable with Hb of 8.7 at the time of discharge.

## 2022-10-01 NOTE — DISCHARGE NOTE OB - NS MD DC FALL RISK RISK
For information on Fall & Injury Prevention, visit: [email or web address] OR  [email or web address] OR  https://www.cdc.gov/steadi/patient.html

## 2022-10-01 NOTE — PROGRESS NOTE ADULT - SUBJECTIVE AND OBJECTIVE BOX
Date of service: 09-30-22 @ 09:57    Lying in bed in NAD  Has urinary frequency  No fever  Anxious about the baby    ROS: no fever or chills; denies dizziness, no HA, no SOB or cough, no abdominal pain, no diarrhea or constipation; no legs pain, no rashes    MEDICATIONS  (STANDING):  acetaminophen     Tablet .. 975 milliGRAM(s) Oral <User Schedule>  ampicillin/sulbactam  IVPB      ampicillin/sulbactam  IVPB 3 Gram(s) IV Intermittent every 6 hours  dextrose 5% + lactated ringers. 1000 milliLiter(s) (125 mL/Hr) IV Continuous <Continuous>  diphtheria/tetanus/pertussis (acellular) Vaccine (ADAcel) 0.5 milliLiter(s) IntraMuscular once  enoxaparin Injectable 40 milliGRAM(s) SubCutaneous every 24 hours  ferrous    sulfate 325 milliGRAM(s) Oral two times a day  ibuprofen  Tablet. 600 milliGRAM(s) Oral every 6 hours  influenza   Vaccine 0.5 milliLiter(s) IntraMuscular once  lactated ringers. 1000 milliLiter(s) (125 mL/Hr) IV Continuous <Continuous>  measles/mumps/rubella Vaccine 0.5 milliLiter(s) SubCutaneous once  morphine PF Epidural 3 milliGRAM(s) Epidural once  oxytocin Infusion 333.333 milliUNIT(s)/Min (1000 mL/Hr) IV Continuous <Continuous>    Vital Signs Last 24 Hrs  T(C): 36.4 (30 Sep 2022 01:31), Max: 36.9 (29 Sep 2022 12:28)  T(F): 97.5 (30 Sep 2022 01:31), Max: 98.4 (29 Sep 2022 12:28)  HR: 70 (30 Sep 2022 01:31) (68 - 81)  BP: 112/61 (30 Sep 2022 01:31) (112/61 - 117/69)  BP(mean): 71 (30 Sep 2022 01:31) (71 - 71)  RR: 18 (30 Sep 2022 01:31) (17 - 18)  SpO2: 100% (30 Sep 2022 01:31) (99% - 100%)    Parameters below as of 30 Sep 2022 01:31  Patient On (Oxygen Delivery Method): room air     Physical exam:    Constitutional:  No acute distress  HEENT: NC/AT, EOMI, PERRLA, conjunctivae clear; ears and nose atraumatic  Neck: supple; thyroid not palpable  Back: no tenderness  Respiratory: respiratory effort normal; clear to auscultation  Cardiovascular: S1S2 regular, no murmurs  Abdomen: soft, not tender, mild distended, positive BS  Genitourinary: no suprapubic tenderness  Lymphatic: no LN palpable  Musculoskeletal: no muscle tenderness, no joint swelling or tenderness  Extremities: no pedal edema  Neurological/ Psychiatric: AxOx3, judgement and insight normal; moving all extremities  Skin: no rashes; no palpable lesions    Labs: reviewed                        8.7    7.57  )-----------( 279      ( 29 Sep 2022 07:55 )             26.3       COVID-19 PCR: NotDetec (09-25-22 @ 21:50)      Culture - Blood (collected 26 Sep 2022 12:55)  Source: .Blood None  Gram Stain (28 Sep 2022 15:21):    Growth in anaerobic bottle: Gram Negative Rods  Preliminary Report (28 Sep 2022 15:21):    Growth in anaerobic bottle: Gram Negative Rods  Organism: Blood Culture PCR (28 Sep 2022 17:55)      -  Escherichia coli: Detec      Method Type: PCR    Culture - Urine (collected 26 Sep 2022 12:50)  Source: Catheterized Catheterized  Final Report (28 Sep 2022 17:15):    >100,000 CFU/ml Escherichia coli  Organism: Escherichia coli (28 Sep 2022 17:15)  Organism: Escherichia coli (28 Sep 2022 17:15)      -  Amikacin: S <=16      -  Amoxicillin/Clavulanic Acid: S <=8/4      -  Ampicillin: S <=8 These ampicillin results predict results for amoxicillin      -  Ampicillin/Sulbactam: S <=4/2 Enterobacter, Klebsiella aerogenes, Citrobacter, and Serratia may develop resistance during prolonged therapy (3-4 days)      -  Aztreonam: S <=4      -  Cefazolin: S <=2 For uncomplicated UTI with K. pneumoniae, E. coli, or P. mirablis: DYLAN <=16 is sensitive and DYLAN >=32 is resistant. This also predicts results for oral agents cefaclor, cefdinir, cefpodoxime, cefprozil, cefuroxime axetil, cephalexin and locarbef for uncomplicated UTI. Note that some isolates may be susceptible to these agents while testing resistant to cefazolin.      -  Cefepime: S <=2      -  Cefoxitin: S <=8      -  Ceftriaxone: S <=1 Enterobacter, Klebsiella aerogenes, Citrobacter, and Serratia may develop resistance during prolonged therapy      -  Ciprofloxacin: R >2      -  Ertapenem: S <=0.5      -  Gentamicin: S <=2      -  Imipenem: S <=1      -  Levofloxacin: R >4      -  Meropenem: S <=1      -  Nitrofurantoin: S <=32 Should not be used to treat pyelonephritis      -  Piperacillin/Tazobactam: S <=8      -  Tigecycline: S <=2      -  Tobramycin: S <=2      -  Trimethoprim/Sulfamethoxazole: R >2/38      Method Type: DYLAN    Radiology: all available radiological tests reviewed    Advanced directives addressed: full resuscitation
Postoperative Note,  Section  Patient is a  27y woman who is now post-operative day #1    Subjective:  Patient is without complaints. Lochia mild. Voiding well. No Flatus. No BM.    Physical exam:    Vital Signs Last 24 Hrs  T(C): 36.8 (27 Sep 2022 20:14), Max: 36.8 (27 Sep 2022 20:14)  T(F): 98.3 (27 Sep 2022 20:14), Max: 98.3 (27 Sep 2022 20:14)  HR: 77 (27 Sep 2022 20:14) (77 - 98)  BP: 97/51 (27 Sep 2022 20:14) (96/57 - 106/52)  BP(mean): --  RR: 16 (27 Sep 2022 20:14) (16 - 16)  SpO2: 100% (27 Sep 2022 20:14) (97% - 100%)    Parameters below as of 27 Sep 2022 20:14  Patient On (Oxygen Delivery Method): room air        Gen: NAD  Breast: Soft, nontender, not engorged.  Abdomen: Soft, nontender. fundus firm below umbilicus.  Dressing: Clean, dry, and intact.  Ext: No tenderness    LABS:                        9.4    23.94 )-----------( 178      ( 27 Sep 2022 07:48 )             28.2   Urine Culture +>100k E. Coli    Sensitivities pending.    Antibody Screen: NEG  Type + Screen: --    Rubella status: immune  Hepatitis B Surface Ag status:negative    Allergies    No Known Allergies    Intolerances      MEDICATIONS  (STANDING):  acetaminophen     Tablet .. 975 milliGRAM(s) Oral <User Schedule>  amoxicillin  500 milliGRAM(s)/clavulanate 1 Tablet(s) Oral three times a day  dextrose 5% + lactated ringers. 1000 milliLiter(s) (125 mL/Hr) IV Continuous <Continuous>  diphtheria/tetanus/pertussis (acellular) Vaccine (ADAcel) 0.5 milliLiter(s) IntraMuscular once  enoxaparin Injectable 40 milliGRAM(s) SubCutaneous every 24 hours  ibuprofen  Tablet. 600 milliGRAM(s) Oral every 6 hours  influenza   Vaccine 0.5 milliLiter(s) IntraMuscular once  lactated ringers. 1000 milliLiter(s) (125 mL/Hr) IV Continuous <Continuous>  morphine PF Epidural 3 milliGRAM(s) Epidural once  oxytocin Infusion 333.333 milliUNIT(s)/Min (1000 mL/Hr) IV Continuous <Continuous>    MEDICATIONS  (PRN):  diphenhydrAMINE 25 milliGRAM(s) Oral every 6 hours PRN Pruritus  HYDROmorphone  Injectable 1 milliGRAM(s) IV Push every 3 hours PRN Severe Pain (7 - 10)  lanolin Ointment 1 Application(s) Topical every 6 hours PRN Sore Nipples  magnesium hydroxide Suspension 30 milliLiter(s) Oral two times a day PRN Constipation  naloxone Injectable 0.1 milliGRAM(s) IV Push every 3 minutes PRN For ANY of the following changes in patient status:  A. RR LESS THAN 10 breaths per minute, B. Oxygen saturation LESS THAN 90%, C. Sedation score of 6  ondansetron Injectable 4 milliGRAM(s) IV Push every 6 hours PRN Nausea  oxyCODONE    IR 5 milliGRAM(s) Oral every 3 hours PRN Moderate to Severe Pain (4-10)  oxyCODONE    IR 5 milliGRAM(s) Oral once PRN Moderate to Severe Pain (4-10)  oxyCODONE    IR 5 milliGRAM(s) Oral every 3 hours PRN Mild Pain (1 - 3)  oxyCODONE    IR 10 milliGRAM(s) Oral every 3 hours PRN Moderate Pain (4 - 6)  simethicone 80 milliGRAM(s) Chew every 4 hours PRN Gas        Assessment:  POD # 1 s/p   Chorioamnionitis in labor. S/P IV gentamicin and Clindamycin x 24 hours.  Now afebrile, however: 1)+urine culture >100k E. Coli. Sensitivities pending. 2)WBC=23.94    Plan:  PO Augmentin ordered.  Awaiting sensitivities on +urine culture.  CBC ordered for AM.  Follow temps.  Encourage ambulation.  Progressive care.      
Postpartum Note,  Section  Patient is a 27y woman  who is s/p  POD #1      Subjective: Patient seen and examined at bedside. No acute events overnight. Pain well controlled with current pain regimen. She is ambulating well and tolerating PO diet/fluids. She reports nol bleeding/discharge at incision site. She is voiding well and reports flatus but no BM. Reports breastfeeding without difficulties. Denies fever, headache, changes in vision, nausea, vomiting. Otherwise, patient feels well without additional complaints.     Physical exam:    Vital Signs Last 24 Hrs  T(C): 36.7 (27 Sep 2022 12:00), Max: 37.7 (26 Sep 2022 14:00)  T(F): 98 (27 Sep 2022 12:00), Max: 99.9 (26 Sep 2022 14:00)  HR: 83 (27 Sep 2022 12:00) (57 - 108)  BP: 96/57 (27 Sep 2022 12:00) (90/51 - 119/59)  BP(mean): --  RR: 16 (27 Sep 2022 12:00) (16 - 21)  SpO2: 98% (27 Sep 2022 12:00) (92% - 100%)    Parameters below as of 27 Sep 2022 12:00  Patient On (Oxygen Delivery Method): room air        Gen: Well developed female who is sitting OOB to chair and is in NAD  Breast: Soft, nontender, not engorged  Cardio: S1,S2 no murmurs  Lungs: CTA B/L, no wheezing  Abdomen: Soft, nontender, no distension, firm uterine fundus at umbilicus.  Incision: Clean, dry, and intact  Ext: No calf tenderness bilaterally, 1+ edema noted.     LABS:                        9.4    23.94 )-----------( 178      ( 27 Sep 2022 07:48 )             28.2         Allergies    No Known Allergies    Intolerances      MEDICATIONS  (STANDING):  acetaminophen     Tablet .. 975 milliGRAM(s) Oral <User Schedule>  dextrose 5% + lactated ringers. 1000 milliLiter(s) (125 mL/Hr) IV Continuous <Continuous>  diphtheria/tetanus/pertussis (acellular) Vaccine (ADAcel) 0.5 milliLiter(s) IntraMuscular once  enoxaparin Injectable 40 milliGRAM(s) SubCutaneous every 24 hours  ibuprofen  Tablet. 600 milliGRAM(s) Oral every 6 hours  influenza   Vaccine 0.5 milliLiter(s) IntraMuscular once  lactated ringers. 1000 milliLiter(s) (125 mL/Hr) IV Continuous <Continuous>  morphine PF Epidural 3 milliGRAM(s) Epidural once  oxytocin Infusion 333.333 milliUNIT(s)/Min (1000 mL/Hr) IV Continuous <Continuous>    MEDICATIONS  (PRN):  diphenhydrAMINE 25 milliGRAM(s) Oral every 6 hours PRN Pruritus  HYDROmorphone  Injectable 1 milliGRAM(s) IV Push every 3 hours PRN Severe Pain (7 - 10)  lanolin Ointment 1 Application(s) Topical every 6 hours PRN Sore Nipples  magnesium hydroxide Suspension 30 milliLiter(s) Oral two times a day PRN Constipation  naloxone Injectable 0.1 milliGRAM(s) IV Push every 3 minutes PRN For ANY of the following changes in patient status:  A. RR LESS THAN 10 breaths per minute, B. Oxygen saturation LESS THAN 90%, C. Sedation score of 6  ondansetron Injectable 4 milliGRAM(s) IV Push every 6 hours PRN Nausea  oxyCODONE    IR 5 milliGRAM(s) Oral every 3 hours PRN Moderate to Severe Pain (4-10)  oxyCODONE    IR 5 milliGRAM(s) Oral once PRN Moderate to Severe Pain (4-10)  oxyCODONE    IR 5 milliGRAM(s) Oral every 3 hours PRN Mild Pain (1 - 3)  oxyCODONE    IR 10 milliGRAM(s) Oral every 3 hours PRN Moderate Pain (4 - 6)  simethicone 80 milliGRAM(s) Chew every 4 hours PRN Gas        Assessment and Plan:  Patient is a 27y woman  who is s/p  POD #1   - s/p C/S POD # 1  - Routine post-op care.  - Pain well controlled, continue current pain regimen.  - Patient is s/p Gent, zachary and azithromycin for chorio - course completed earlier today   - Encouraged ambulation.  - Encouraged PO diet/fluids.  - Voiding well, +flatus but no BM  - Encouraged breastfeeding.            
Postoperative Note,  Section  Patient is a  27y woman who is now post-operative day #2.    Subjective:  Patient is without complaints. Lochia mild. Voiding well. +Flatus. No BM.    Physical exam:    Vital Signs Last 24 Hrs  T(C): 36.8 (28 Sep 2022 08:02), Max: 37.2 (28 Sep 2022 00:15)  T(F): 98.2 (28 Sep 2022 08:02), Max: 98.9 (28 Sep 2022 00:15)  HR: 61 (28 Sep 2022 08:02) (61 - 83)  BP: 116/70 (28 Sep 2022 08:02) (96/57 - 116/70)  BP(mean): 77 (28 Sep 2022 04:15) (70 - 77)  RR: 16 (28 Sep 2022 08:02) (16 - 18)  SpO2: 100% (28 Sep 2022 08:02) (98% - 100%)    Parameters below as of 28 Sep 2022 08:02  Patient On (Oxygen Delivery Method): room air        Gen: NAD  Breast: Soft, nontender, not engorged.  Abdomen: Soft, nontender. fundus firm below umbilicus.  Incision: Clean, dry, and intact.  Ext: No tenderness    LABS:                        8.6    11.98 )-----------( 193      ( 28 Sep 2022 07:50 )             26.1     09-25 @ 21:50   Antibody Screen: NEG  Type + Screen: --Rh positive    Rubella status: immune  Hepatitis B Surface Ag status:negative    Allergies    No Known Allergies    Intolerances      MEDICATIONS  (STANDING):  acetaminophen     Tablet .. 975 milliGRAM(s) Oral <User Schedule>  amoxicillin  500 milliGRAM(s)/clavulanate 1 Tablet(s) Oral three times a day  dextrose 5% + lactated ringers. 1000 milliLiter(s) (125 mL/Hr) IV Continuous <Continuous>  diphtheria/tetanus/pertussis (acellular) Vaccine (ADAcel) 0.5 milliLiter(s) IntraMuscular once  enoxaparin Injectable 40 milliGRAM(s) SubCutaneous every 24 hours  ferrous    sulfate 325 milliGRAM(s) Oral two times a day  ibuprofen  Tablet. 600 milliGRAM(s) Oral every 6 hours  influenza   Vaccine 0.5 milliLiter(s) IntraMuscular once  lactated ringers. 1000 milliLiter(s) (125 mL/Hr) IV Continuous <Continuous>  morphine PF Epidural 3 milliGRAM(s) Epidural once  oxytocin Infusion 333.333 milliUNIT(s)/Min (1000 mL/Hr) IV Continuous <Continuous>    MEDICATIONS  (PRN):  diphenhydrAMINE 25 milliGRAM(s) Oral every 6 hours PRN Pruritus  HYDROmorphone  Injectable 1 milliGRAM(s) IV Push every 3 hours PRN Severe Pain (7 - 10)  lanolin Ointment 1 Application(s) Topical every 6 hours PRN Sore Nipples  magnesium hydroxide Suspension 30 milliLiter(s) Oral two times a day PRN Constipation  naloxone Injectable 0.1 milliGRAM(s) IV Push every 3 minutes PRN For ANY of the following changes in patient status:  A. RR LESS THAN 10 breaths per minute, B. Oxygen saturation LESS THAN 90%, C. Sedation score of 6  ondansetron Injectable 4 milliGRAM(s) IV Push every 6 hours PRN Nausea  oxyCODONE    IR 5 milliGRAM(s) Oral every 3 hours PRN Moderate to Severe Pain (4-10)  oxyCODONE    IR 5 milliGRAM(s) Oral once PRN Moderate to Severe Pain (4-10)  oxyCODONE    IR 5 milliGRAM(s) Oral every 3 hours PRN Mild Pain (1 - 3)  oxyCODONE    IR 10 milliGRAM(s) Oral every 3 hours PRN Moderate Pain (4 - 6)  simethicone 80 milliGRAM(s) Chew every 4 hours PRN Gas        Assessment:  POD # 2 s/p   Chorioamnionitis in labor, S/P IV clinda/gent. Now on PO augmentin. WBC this morning is declining and pt remains afebrile.  Urine culture +100k E. Coli. Sensitivities pending but should be sensitive to augmentin. Will adjust antibiotics if needed once sensitivities available.  H/H lower today, FeSO4 BID ordered.  MOM ordered.      Plan:  Encourage ambulation.  Progressive care.      
Postoperative Note,  Section  Patient is a  27y woman who is now post-operative day #3    Subjective:  Patient is without complaints. Lochia mild. Voiding well. +Flatus. +BM. Tolerating PO intake.    Physical exam:    Vital Signs Last 24 Hrs  T(C): 36.7 (29 Sep 2022 05:30), Max: 37 (28 Sep 2022 19:15)  T(F): 98 (29 Sep 2022 05:30), Max: 98.6 (28 Sep 2022 19:15)  HR: 77 (29 Sep 2022 05:30) (77 - 93)  BP: 108/70 (29 Sep 2022 05:30) (107/65 - 111/67)  BP(mean): --  RR: 17 (29 Sep 2022 05:30) (16 - 17)  SpO2: 99% (29 Sep 2022 05:30) (99% - 100%)    Parameters below as of 29 Sep 2022 05:30  Patient On (Oxygen Delivery Method): room air        Gen: NAD  Breast: Soft, nontender, not engorged.  Abdomen: Soft, nontender. fundus firm below umbilicus.  Incision: Clean, dry, and intact.  Ext: No tenderness    LABS:                        8.7    7.57  )-----------( x        ( 29 Sep 2022 07:55 )             26.3     09-25 @ 21:50   Antibody Screen: NEG  Type + Screen: --Rh postive    Rubella status: immune  Hepatitis B Surface Ag status:negative    Allergies    No Known Allergies    Intolerances      MEDICATIONS  (STANDING):  acetaminophen     Tablet .. 975 milliGRAM(s) Oral <User Schedule>  cefTRIAXone   IVPB      cefTRIAXone   IVPB 1000 milliGRAM(s) IV Intermittent every 24 hours  dextrose 5% + lactated ringers. 1000 milliLiter(s) (125 mL/Hr) IV Continuous <Continuous>  diphtheria/tetanus/pertussis (acellular) Vaccine (ADAcel) 0.5 milliLiter(s) IntraMuscular once  enoxaparin Injectable 40 milliGRAM(s) SubCutaneous every 24 hours  ferrous    sulfate 325 milliGRAM(s) Oral two times a day  ibuprofen  Tablet. 600 milliGRAM(s) Oral every 6 hours  influenza   Vaccine 0.5 milliLiter(s) IntraMuscular once  lactated ringers. 1000 milliLiter(s) (125 mL/Hr) IV Continuous <Continuous>  measles/mumps/rubella Vaccine 0.5 milliLiter(s) SubCutaneous once  morphine PF Epidural 3 milliGRAM(s) Epidural once  oxytocin Infusion 333.333 milliUNIT(s)/Min (1000 mL/Hr) IV Continuous <Continuous>    MEDICATIONS  (PRN):  diphenhydrAMINE 25 milliGRAM(s) Oral every 6 hours PRN Pruritus  HYDROmorphone  Injectable 1 milliGRAM(s) IV Push every 3 hours PRN Severe Pain (7 - 10)  lanolin Ointment 1 Application(s) Topical every 6 hours PRN Sore Nipples  magnesium hydroxide Suspension 30 milliLiter(s) Oral two times a day PRN Constipation  naloxone Injectable 0.1 milliGRAM(s) IV Push every 3 minutes PRN For ANY of the following changes in patient status:  A. RR LESS THAN 10 breaths per minute, B. Oxygen saturation LESS THAN 90%, C. Sedation score of 6  ondansetron Injectable 4 milliGRAM(s) IV Push every 6 hours PRN Nausea  oxyCODONE    IR 5 milliGRAM(s) Oral every 3 hours PRN Mild Pain (1 - 3)  oxyCODONE    IR 10 milliGRAM(s) Oral every 3 hours PRN Moderate Pain (4 - 6)  oxyCODONE    IR 5 milliGRAM(s) Oral every 3 hours PRN Moderate to Severe Pain (4-10)  oxyCODONE    IR 5 milliGRAM(s) Oral once PRN Moderate to Severe Pain (4-10)  simethicone 80 milliGRAM(s) Chew every 4 hours PRN Gas        Assessment:  POD # 3 S/P   chorioamnionitis in labor, now afebrile, with normal WBC.  +Blood and Urine Cultures; E.Coli - pt on IV ceftriaxone per ID.  Anticipate D/C to home on PO antibiotics tomorrow.  Awaiting further guidance from ID.  Measles nonimmune-MMR vaccine ordered.    
Postoperative Note,  Section  Patient is a  27y woman who is now post-operative day #4.  S/P  for nonreassuring FHR. Chorioamnionitis in labor.   +Blood cultures-E. Coli. Pt on IV Unasyn per ID.    Subjective:  Patient is without complaints. Lochia mild. Voiding well. +Flatus. +BM. Tolerating PO intake.    Physical exam:    Vital Signs Last 24 Hrs  T(C): 36.7 (30 Sep 2022 08:06), Max: 36.9 (29 Sep 2022 12:28)  T(F): 98.1 (30 Sep 2022 08:06), Max: 98.4 (29 Sep 2022 12:28)  HR: 78 (30 Sep 2022 08:06) (68 - 81)  BP: 109/80 (30 Sep 2022 08:06) (109/80 - 117/69)  BP(mean): 71 (30 Sep 2022 01:31) (71 - 71)  RR: 18 (30 Sep 2022 08:06) (17 - 18)  SpO2: 100% (30 Sep 2022 08:06) (99% - 100%)    Parameters below as of 30 Sep 2022 08:06  Patient On (Oxygen Delivery Method): room air        Gen: NAD  Breast: Soft, nontender, not engorged.  Abdomen: Soft, nontender. fundus firm below umbilicus.  Incision: Clean, dry, and intact.  Ext: No tenderness    LABS:                        8.7    7.57  )-----------( 279      ( 29 Sep 2022 07:55 )             26.3     09-25 @ 21:50   Antibody Screen: NEG  Type + Screen: --Rh positive    Rubella status: immune  Hepatitis B Surface Ag status:negative    Allergies    No Known Allergies    Intolerances      MEDICATIONS  (STANDING):  acetaminophen     Tablet .. 975 milliGRAM(s) Oral <User Schedule>  ampicillin/sulbactam  IVPB      ampicillin/sulbactam  IVPB 3 Gram(s) IV Intermittent every 6 hours  dextrose 5% + lactated ringers. 1000 milliLiter(s) (125 mL/Hr) IV Continuous <Continuous>  diphtheria/tetanus/pertussis (acellular) Vaccine (ADAcel) 0.5 milliLiter(s) IntraMuscular once  enoxaparin Injectable 40 milliGRAM(s) SubCutaneous every 24 hours  ferrous    sulfate 325 milliGRAM(s) Oral two times a day  ibuprofen  Tablet. 600 milliGRAM(s) Oral every 6 hours  influenza   Vaccine 0.5 milliLiter(s) IntraMuscular once  lactated ringers. 1000 milliLiter(s) (125 mL/Hr) IV Continuous <Continuous>  measles/mumps/rubella Vaccine 0.5 milliLiter(s) SubCutaneous once  morphine PF Epidural 3 milliGRAM(s) Epidural once  oxytocin Infusion 333.333 milliUNIT(s)/Min (1000 mL/Hr) IV Continuous <Continuous>    MEDICATIONS  (PRN):  diphenhydrAMINE 25 milliGRAM(s) Oral every 6 hours PRN Pruritus  HYDROmorphone  Injectable 1 milliGRAM(s) IV Push every 3 hours PRN Severe Pain (7 - 10)  lanolin Ointment 1 Application(s) Topical every 6 hours PRN Sore Nipples  magnesium hydroxide Suspension 30 milliLiter(s) Oral two times a day PRN Constipation  naloxone Injectable 0.1 milliGRAM(s) IV Push every 3 minutes PRN For ANY of the following changes in patient status:  A. RR LESS THAN 10 breaths per minute, B. Oxygen saturation LESS THAN 90%, C. Sedation score of 6  ondansetron Injectable 4 milliGRAM(s) IV Push every 6 hours PRN Nausea  oxyCODONE    IR 5 milliGRAM(s) Oral every 3 hours PRN Moderate to Severe Pain (4-10)  oxyCODONE    IR 5 milliGRAM(s) Oral once PRN Moderate to Severe Pain (4-10)  oxyCODONE    IR 5 milliGRAM(s) Oral every 3 hours PRN Mild Pain (1 - 3)  oxyCODONE    IR 10 milliGRAM(s) Oral every 3 hours PRN Moderate Pain (4 - 6)  simethicone 80 milliGRAM(s) Chew every 4 hours PRN Gas        Assessment:  POD # 4 S/P   Continue IV unasyn per ID. Likely d/c to home in next 1-2 days on PO augmentin, once cleared by ID.   Continue FeSO4 bid.  MMR vaccine ordered.           
Postoperative Note,  Section  Patient is a  27y woman who is now post-operative day #5  S/P  for nonreassuring FHR. Chorioamnionitis in labor.   +Blood cultures-E. Coli. Pt on IV Unasyn per ID recs.    Subjective:  Patient is without complaints.   Lochia appropirete. Voiding well. +Flatus. +BM.   Tolerating PO intake.  Pain well controlled with current treatment regimen    Physical exam:    Vital Signs Last 24 Hrs  T(C): 36.6 (01 Oct 2022 04:06), Max: 36.7 (30 Sep 2022 08:06)  T(F): 97.9 (01 Oct 2022 04:06), Max: 98.1 (30 Sep 2022 08:06)  HR: 87 (01 Oct 2022 04:06) (78 - 92)  BP: 108/63 (01 Oct 2022 04:06) (108/63 - 126/79)  BP(mean): --  RR: 18 (01 Oct 2022 04:06) (18 - 18)  SpO2: 100% (01 Oct 2022 04:06) (100% - 100%)    Parameters below as of 01 Oct 2022 04:06  Patient On (Oxygen Delivery Method): room air          Gen: NAD  Breast: Soft, nontender, not engorged.  Abdomen: Soft, nontender. fundus firm below umbilicus.  Incision: Clean, dry, and intact.  Ext: No tenderness    LABS:                        8.7    7.57  )-----------( 279      ( 29 Sep 2022 07:55 )             26.3     09-25 @ 21:50   Antibody Screen: NEG  Type + Screen: --Rh positive    Rubella status: immune  Hepatitis B Surface Ag status:negative    Allergies    No Known Allergies    Intolerances      MEDICATIONS  (STANDING):  acetaminophen     Tablet .. 975 milliGRAM(s) Oral <User Schedule>  ampicillin/sulbactam  IVPB      ampicillin/sulbactam  IVPB 3 Gram(s) IV Intermittent every 6 hours  dextrose 5% + lactated ringers. 1000 milliLiter(s) (125 mL/Hr) IV Continuous <Continuous>  diphtheria/tetanus/pertussis (acellular) Vaccine (ADAcel) 0.5 milliLiter(s) IntraMuscular once  enoxaparin Injectable 40 milliGRAM(s) SubCutaneous every 24 hours  ferrous    sulfate 325 milliGRAM(s) Oral two times a day  ibuprofen  Tablet. 600 milliGRAM(s) Oral every 6 hours  influenza   Vaccine 0.5 milliLiter(s) IntraMuscular once  lactated ringers. 1000 milliLiter(s) (125 mL/Hr) IV Continuous <Continuous>  measles/mumps/rubella Vaccine 0.5 milliLiter(s) SubCutaneous once  morphine PF Epidural 3 milliGRAM(s) Epidural once  oxytocin Infusion 333.333 milliUNIT(s)/Min (1000 mL/Hr) IV Continuous <Continuous>    MEDICATIONS  (PRN):  diphenhydrAMINE 25 milliGRAM(s) Oral every 6 hours PRN Pruritus  HYDROmorphone  Injectable 1 milliGRAM(s) IV Push every 3 hours PRN Severe Pain (7 - 10)  lanolin Ointment 1 Application(s) Topical every 6 hours PRN Sore Nipples  magnesium hydroxide Suspension 30 milliLiter(s) Oral two times a day PRN Constipation  naloxone Injectable 0.1 milliGRAM(s) IV Push every 3 minutes PRN For ANY of the following changes in patient status:  A. RR LESS THAN 10 breaths per minute, B. Oxygen saturation LESS THAN 90%, C. Sedation score of 6  ondansetron Injectable 4 milliGRAM(s) IV Push every 6 hours PRN Nausea  oxyCODONE    IR 5 milliGRAM(s) Oral every 3 hours PRN Moderate to Severe Pain (4-10)  oxyCODONE    IR 5 milliGRAM(s) Oral once PRN Moderate to Severe Pain (4-10)  oxyCODONE    IR 5 milliGRAM(s) Oral every 3 hours PRN Mild Pain (1 - 3)  oxyCODONE    IR 10 milliGRAM(s) Oral every 3 hours PRN Moderate Pain (4 - 6)  simethicone 80 milliGRAM(s) Chew every 4 hours PRN Gas

## 2022-10-01 NOTE — DISCHARGE NOTE OB - MEDICATION SUMMARY - MEDICATIONS TO TAKE
I will START or STAY ON the medications listed below when I get home from the hospital:    acetaminophen 325 mg oral tablet  -- 3 tab(s) by mouth every 6 hours, As Needed  -- Indication: For Pain    ibuprofen 600 mg oral tablet  -- 1 tab(s) by mouth every 6 hours, As Needed  -- Indication: For Pain    ferrous sulfate 325 mg (65 mg elemental iron) oral tablet  -- 1 tab(s) by mouth 2 times a day  -- Indication: For Anemia

## 2022-10-01 NOTE — PROGRESS NOTE ADULT - ASSESSMENT
Assessment:  POD # 5 S/P   VSS  Voiding, tolerating PO, normal bowel function  Continue IV unasyn per ID. Likely d/c to home in next 1-2 days on PO augmentin, once cleared by ID.   Continue with PO iron  MMR vaccine ordered.  Pt to be discharged when stable and meeting all postpartum and postop milestones. Pending attending approval

## 2022-10-01 NOTE — DISCHARGE NOTE OB - CARE PROVIDER_API CALL
Sen Carroll)  Obstetrics and Gynecology  [address], [address]  [place] [address]  Phone: [phone]  Fax: [phone]  Follow Up Time:     Justin Augustin  INFECTIOUS DISEASE  [address], [address], [place] [address]  Phone: [phone]  Fax: [phone]  Follow Up Time: 1 week

## 2022-10-01 NOTE — DISCHARGE NOTE OB - PATIENT PORTAL LINK FT
You can access the FollowMyHealth Patient Portal offered by [facility] by registering at the following website: [email or web address]. By joining [email or web address]’s FollowMyHealth portal, you will also be able to view your health information using other applications (apps) compatible with our system.

## 2022-10-01 NOTE — DISCHARGE NOTE OB - CARE PLAN
1 Principal Discharge DX:	Delivery of first pregnancy by  section  Assessment and plan of treatment:	1) Please take ibuprofen and/or Tylenol as needed for pain as prescribed.  2) Nothing in the vagina for 6 weeks (including no sex, no tampons, and no douching).  3) Please call your doctor for a follow up your postpartum appointment in 4 weeks.  4) Please continue taking vitamins postpartum. Take iron and colace for acute blood loss anemia.  5) Please call the office sooner if you have heavy vaginal bleeding, severe abdominal pain, or fever > 100.4F.  6) You may resume regular daily activity as tolerated  Secondary Diagnosis:	E coli bacteremia  Assessment and plan of treatment:	patient s/p C section was found to have e-coli bacteremia. ID was consulted and patient was started on Ceftriaxone IV  which was transitioned to oral Augmentin 875 q12 x 14 days at the time of discharge.   Patient is advised to follow up with her PCP and ID with a week after discharge from the hospital.   Patient is advised to continue Augmentin for 14 days with last day on 10/15/22

## 2022-10-01 NOTE — DISCHARGE NOTE OB - NPI NUMBER (FOR SYSADMIN USE ONLY) :
7mo old 35 wk male with h/x of CLD, pulm htn, Luke Pavan s/p mandibular distraction, SONU, Dandy Walker syndrome, VSD with bidirectional shunting, adrenal insufficiency, FTT, G-tube dependent, penile-scrotal hypospadias here with pulmonary hypertensive crisis.    Interval/Overnight Events:  had ETT pulled back .5cm after XR.    VITAL SIGNS:  T(C): 36.4 (17 @ 06:00), Max: 37.8 (17 @ 20:00)  HR: 109 (17 @ 06:00) (100 - 187)  BP: 78/40 (17 @ 06:00) (78/40 - 116/79)  RR: 28 (17 @ 06:00) (28 - 30)  SpO2: 100% (17 @ 06:00) (88% - 100%)  CVP(mm Hg): 7 (17 @ 06:00) (4 - 10)  End-Tidal CO2: 35-45  BIS    ===============================RESPIRATORY==============================  [ ] Mechanical Ventilation: Mode: SIMV with PS, RR (machine): 28, 26/8 PS 10,  FiO2: 50, ITime: 0.6  [ ] Inhaled Nitric Oxide: 5ppm  CBG - ( 2017 03:27 )  pH: 7.46  /  pCO2: 47    /  pO2: 68.3  / HCO3: 32    / Base Excess: 9.1   /  SO2: 92.6  / Lactate: 1.5      Respiratory Medications:  ALBUTerol  Intermittent Nebulization - Peds 2.5 milliGRAM(s) Nebulizer every 6 hours  buDESOnide   for Nebulization - Peds 0.25 milliGRAM(s) Nebulizer every 12 hours    CXR: no significant change    [ ] Extubation Readiness Assessed  Comments:    =============================CARDIOVASCULAR============================  Cardiovascular Medications:  sildenafil   Oral Liquid - Peds 5 milliGRAM(s) Oral every 8 hours  chlorothiazide  Oral Liquid - Peds 45 milliGRAM(s) Oral every 12 hours  milrinone Infusion - Peds 0.5 MICROgram(s)/kG/Min IV Continuous <Continuous>  bosentan Oral Liquid - Peds 5 milliGRAM(s) Oral every 12 hours  furosemide Infusion - Peds 0.15 mG/kG/Hr IV Continuous <Continuous>      Echocardiogram, Pediatric (17 @ 10:42)   5. Flattened systolic configuration of interventricular septum ("D-shaped" left ventricle) and posterior diastolic bowing of interventricular septum.   6. Pulmonary artery pressure estimated at systemic level.      Cardiac Rhythm:	[x] NSR		[ ] Other:  Comments:    =========================HEMATOLOGY/ONCOLOGY=========================                                            10.3                  Neurophils% (auto):   78.6   ( @ 03:45):    11.22)-----------(292          Lymphocytes% (auto):  11.2                                          31.6                   Eosinphils% (auto):   1.0      Manual%: Neutrophils x    ; Lymphocytes x    ; Eosinophils x    ; Bands%: x    ; Blasts x          Transfusions:	[ ] PRBC	[ ] Platelets	[ ] FFP		[ ] Cryoprecipitate    Hematologic/Oncologic Medications:  heparin   Infusion - Pediatric 0.323 Unit(s)/kG/Hr IV Continuous <Continuous>    DVT Prophylaxis:  Comments:    ============================INFECTIOUS DISEASE===========================  Antimicrobials/Immunologic Medications:    RECENT CULTURES:        ======================FLUIDS/ELECTROLYTES/NUTRITION=====================  I&O's Summary      2017 07:01  -  2017 06:55  --------------------------------------------------------  IN: 712.2 mL / OUT: 655 mL / NET: 57.2 mL      Daily Weight in Gm: 4825 (2017 06:00)                            136    |  92     |  8                   Calcium: 9.4   / iCa: 1.15   ( @ 03:45)    ----------------------------<  124       Magnesium: 1.6                              3.3     |  30     |  < 0.20            Phosphorous: 4.6        Diet:	[ ] Regular	[ ] Soft		[ ] Clears	[ ] NPO  .	[ ] Other:  .	[x] NGT Alimentum 27kcal @ 20cc/h		[ ] NDT		[ ] GT		[ ] GJT    Gastrointestinal Medications:  dextrose 5% + sodium chloride 0.45% with potassium chloride 20 mEq/L. - Pediatric 1000 milliLiter(s) IV Continuous <Continuous>  ranitidine  Oral Liquid - Peds 7.5 milliGRAM(s) Oral two times a day  potassium chloride  Oral Liquid - Peds 4.7 milliEquivalent(s) Oral every 8 hours  sodium chloride 0.9%. -  250 milliLiter(s) IV Continuous <Continuous>    Comments:    ==============================NEUROLOGY===============================  [ ] SBS:		[ ] ESTELITA-1:	[ ] BIS:  [x] Adequacy of sedation and pain control has been assessed and adjusted    Neurologic Medications:  vecuronium Infusion - Peds 0.1 mG/kG/Hr IV Continuous <Continuous>  morphine Infusion - Peds 0.15 mG/kG/Hr IV Continuous <Continuous>  midazolam Infusion - Peds 0.17 mG/kG/Hr IV Continuous <Continuous>  cloNIDine 0.1 mG/24Hr(s) Transdermal Patch - Peds 1 Patch Transdermal every 7 days  cloNIDine  Oral Liquid - Peds 0.025 milliGRAM(s) Oral every 6 hours    Comments:    OTHER MEDICATIONS:  Endocrine/Metabolic Medications:  hydrocortisone   Oral Liquid - Peds 2.5 milliGRAM(s) Enteral Tube <User Schedule>    Genitourinary Medications:  Topical/Other Medications:  petrolatum, white/mineral oil Ophthalmic Ointment - Peds 1 Application(s) Both EYES every 6 hours  polyvinyl alcohol 1.4%/povidone 0.6% Ophthalmic Solution - Peds 1 Drop(s) Both EYES every 4 hours  chlorhexidine 0.12% Oral Liquid - Peds 15 milliLiter(s) Swish and Spit every 12 hours      ======================PATIENT CARE ACCESS DEVICES=======================  [ ] Peripheral IV  [L] Central Venous Line	[ ] R	[ ] L	[x] IJ	[ ] Fem	[ ] SC			Placed:   [ ] Arterial Line		[ ] R	[ ] L	[ ] PT	[ ] DP	[ ] Fem	[ ] Rad	[ ] Ax	Placed:   [ ] PICC:				[ ] Broviac		[ ] Mediport  [ ] Urinary Catheter, Date Placed:   [x] Necessity of urinary, arterial, and venous catheters discussed    =============================PHYSICAL EXAM=============================  GENERAL: In no acute distress    =======================================================================  IMAGING STUDIES: [[id number]],[[id number]]

## 2022-10-01 NOTE — DISCHARGE NOTE OB - PLAN OF CARE
patient s/p C section was found to have e-coli bacteremia. ID was consulted and patient was started on Ceftriaxone IV  which was transitioned to oral Augmentin 875 q12 x 14 days at the time of discharge.   Patient is advised to follow up with her PCP and ID with a week after discharge from the hospital.   Patient is advised to continue Augmentin for 14 days with last day on 10/15/22 1) Please take ibuprofen and/or Tylenol as needed for pain as prescribed.  2) Nothing in the vagina for 6 weeks (including no sex, no tampons, and no douching).  3) Please call your doctor for a follow up your postpartum appointment in 4 weeks.  4) Please continue taking vitamins postpartum. Take iron and colace for acute blood loss anemia.  5) Please call the office sooner if you have heavy vaginal bleeding, severe abdominal pain, or fever > 100.4F.  6) You may resume regular daily activity as tolerated

## 2022-10-01 NOTE — DISCHARGE NOTE OB - ADDITIONAL INSTRUCTIONS
Patient is advised to follow up with her PCP and ID with a week after discharge from the hospital.   Patient is advised to continue Augmentin for 14 days with last day of antibiotic on 10/15/22

## 2022-10-05 DIAGNOSIS — O48.0 POST-TERM PREGNANCY: ICD-10-CM

## 2022-10-05 DIAGNOSIS — R09.02 HYPOXEMIA: ICD-10-CM

## 2022-10-05 DIAGNOSIS — O41.1230 CHORIOAMNIONITIS, THIRD TRIMESTER, NOT APPLICABLE OR UNSPECIFIED: ICD-10-CM

## 2022-10-05 DIAGNOSIS — A41.51 SEPSIS DUE TO ESCHERICHIA COLI [E. COLI]: ICD-10-CM

## 2022-10-05 DIAGNOSIS — Z28.21 IMMUNIZATION NOT CARRIED OUT BECAUSE OF PATIENT REFUSAL: ICD-10-CM

## 2022-10-05 DIAGNOSIS — Z28.09 IMMUNIZATION NOT CARRIED OUT BECAUSE OF OTHER CONTRAINDICATION: ICD-10-CM

## 2022-10-05 DIAGNOSIS — Z3A.40 40 WEEKS GESTATION OF PREGNANCY: ICD-10-CM

## 2022-10-05 DIAGNOSIS — Z34.03 ENCOUNTER FOR SUPERVISION OF NORMAL FIRST PREGNANCY, THIRD TRIMESTER: ICD-10-CM

## 2022-10-13 ENCOUNTER — NON-APPOINTMENT (OUTPATIENT)
Age: 27
End: 2022-10-13

## 2024-12-17 ENCOUNTER — NON-APPOINTMENT (OUTPATIENT)
Age: 29
End: 2024-12-17

## 2025-06-09 ENCOUNTER — EMERGENCY (EMERGENCY)
Facility: HOSPITAL | Age: 30
LOS: 1 days | End: 2025-06-09
Attending: EMERGENCY MEDICINE
Payer: COMMERCIAL

## 2025-06-09 VITALS
OXYGEN SATURATION: 99 % | HEART RATE: 78 BPM | TEMPERATURE: 99 F | DIASTOLIC BLOOD PRESSURE: 76 MMHG | HEIGHT: 60 IN | WEIGHT: 169.98 LBS | SYSTOLIC BLOOD PRESSURE: 120 MMHG | RESPIRATION RATE: 18 BRPM

## 2025-06-09 PROCEDURE — 99285 EMERGENCY DEPT VISIT HI MDM: CPT

## 2025-06-09 NOTE — ED ADULT TRIAGE NOTE - CHIEF COMPLAINT QUOTE
Sent in for Sonogram to rule out ectopic pregnancy   Had miscarriage on 5/23, has had trending upwards HCG levels   endorsing some vaginal spotting,

## 2025-06-10 VITALS
DIASTOLIC BLOOD PRESSURE: 69 MMHG | SYSTOLIC BLOOD PRESSURE: 101 MMHG | TEMPERATURE: 98 F | RESPIRATION RATE: 18 BRPM | HEART RATE: 76 BPM | OXYGEN SATURATION: 99 %

## 2025-06-10 LAB
ALBUMIN SERPL ELPH-MCNC: 4.3 G/DL — SIGNIFICANT CHANGE UP (ref 3.3–5)
ALP SERPL-CCNC: 80 U/L — SIGNIFICANT CHANGE UP (ref 40–120)
ALT FLD-CCNC: 9 U/L — LOW (ref 10–45)
ANION GAP SERPL CALC-SCNC: 13 MMOL/L — SIGNIFICANT CHANGE UP (ref 5–17)
AST SERPL-CCNC: 13 U/L — SIGNIFICANT CHANGE UP (ref 10–40)
BASOPHILS # BLD AUTO: 0.05 K/UL — SIGNIFICANT CHANGE UP (ref 0–0.2)
BASOPHILS NFR BLD AUTO: 0.6 % — SIGNIFICANT CHANGE UP (ref 0–2)
BILIRUB SERPL-MCNC: 0.1 MG/DL — LOW (ref 0.2–1.2)
BLD GP AB SCN SERPL QL: NEGATIVE — SIGNIFICANT CHANGE UP
BUN SERPL-MCNC: 14 MG/DL — SIGNIFICANT CHANGE UP (ref 7–23)
CALCIUM SERPL-MCNC: 8.9 MG/DL — SIGNIFICANT CHANGE UP (ref 8.4–10.5)
CHLORIDE SERPL-SCNC: 104 MMOL/L — SIGNIFICANT CHANGE UP (ref 96–108)
CO2 SERPL-SCNC: 20 MMOL/L — LOW (ref 22–31)
CREAT SERPL-MCNC: 0.6 MG/DL — SIGNIFICANT CHANGE UP (ref 0.5–1.3)
EGFR: 123 ML/MIN/1.73M2 — SIGNIFICANT CHANGE UP
EGFR: 123 ML/MIN/1.73M2 — SIGNIFICANT CHANGE UP
EOSINOPHIL # BLD AUTO: 0.71 K/UL — HIGH (ref 0–0.5)
EOSINOPHIL NFR BLD AUTO: 8.4 % — HIGH (ref 0–6)
GLUCOSE SERPL-MCNC: 107 MG/DL — HIGH (ref 70–99)
HCG SERPL-ACNC: 26.1 MIU/ML — HIGH
HCT VFR BLD CALC: 34.1 % — LOW (ref 34.5–45)
HGB BLD-MCNC: 11.1 G/DL — LOW (ref 11.5–15.5)
IMM GRANULOCYTES NFR BLD AUTO: 0.2 % — SIGNIFICANT CHANGE UP (ref 0–0.9)
LYMPHOCYTES # BLD AUTO: 2.85 K/UL — SIGNIFICANT CHANGE UP (ref 1–3.3)
LYMPHOCYTES # BLD AUTO: 33.8 % — SIGNIFICANT CHANGE UP (ref 13–44)
MCHC RBC-ENTMCNC: 28.5 PG — SIGNIFICANT CHANGE UP (ref 27–34)
MCHC RBC-ENTMCNC: 32.6 G/DL — SIGNIFICANT CHANGE UP (ref 32–36)
MCV RBC AUTO: 87.4 FL — SIGNIFICANT CHANGE UP (ref 80–100)
MONOCYTES # BLD AUTO: 0.7 K/UL — SIGNIFICANT CHANGE UP (ref 0–0.9)
MONOCYTES NFR BLD AUTO: 8.3 % — SIGNIFICANT CHANGE UP (ref 2–14)
NEUTROPHILS # BLD AUTO: 4.1 K/UL — SIGNIFICANT CHANGE UP (ref 1.8–7.4)
NEUTROPHILS NFR BLD AUTO: 48.7 % — SIGNIFICANT CHANGE UP (ref 43–77)
NRBC BLD AUTO-RTO: 0 /100 WBCS — SIGNIFICANT CHANGE UP (ref 0–0)
PLATELET # BLD AUTO: 268 K/UL — SIGNIFICANT CHANGE UP (ref 150–400)
POTASSIUM SERPL-MCNC: 3.8 MMOL/L — SIGNIFICANT CHANGE UP (ref 3.5–5.3)
POTASSIUM SERPL-SCNC: 3.8 MMOL/L — SIGNIFICANT CHANGE UP (ref 3.5–5.3)
PROT SERPL-MCNC: 6.9 G/DL — SIGNIFICANT CHANGE UP (ref 6–8.3)
RBC # BLD: 3.9 M/UL — SIGNIFICANT CHANGE UP (ref 3.8–5.2)
RBC # FLD: 13.8 % — SIGNIFICANT CHANGE UP (ref 10.3–14.5)
RH IG SCN BLD-IMP: POSITIVE — SIGNIFICANT CHANGE UP
SODIUM SERPL-SCNC: 137 MMOL/L — SIGNIFICANT CHANGE UP (ref 135–145)
WBC # BLD: 8.43 K/UL — SIGNIFICANT CHANGE UP (ref 3.8–10.5)
WBC # FLD AUTO: 8.43 K/UL — SIGNIFICANT CHANGE UP (ref 3.8–10.5)

## 2025-06-10 PROCEDURE — 76817 TRANSVAGINAL US OBSTETRIC: CPT | Mod: 26

## 2025-06-10 PROCEDURE — 86900 BLOOD TYPING SEROLOGIC ABO: CPT

## 2025-06-10 PROCEDURE — 85025 COMPLETE CBC W/AUTO DIFF WBC: CPT

## 2025-06-10 PROCEDURE — 93975 VASCULAR STUDY: CPT

## 2025-06-10 PROCEDURE — 80053 COMPREHEN METABOLIC PANEL: CPT

## 2025-06-10 PROCEDURE — 99284 EMERGENCY DEPT VISIT MOD MDM: CPT | Mod: 25

## 2025-06-10 PROCEDURE — 76817 TRANSVAGINAL US OBSTETRIC: CPT

## 2025-06-10 PROCEDURE — 86850 RBC ANTIBODY SCREEN: CPT

## 2025-06-10 PROCEDURE — 86901 BLOOD TYPING SEROLOGIC RH(D): CPT

## 2025-06-10 PROCEDURE — 84702 CHORIONIC GONADOTROPIN TEST: CPT

## 2025-06-10 PROCEDURE — 36415 COLL VENOUS BLD VENIPUNCTURE: CPT

## 2025-06-10 PROCEDURE — 93975 VASCULAR STUDY: CPT | Mod: 26

## 2025-06-10 NOTE — ED PROVIDER NOTE - PATIENT PORTAL LINK FT
You can access the FollowMyHealth Patient Portal offered by [facility] by registering at the following website: [email or web address]. By joining [employer]’s FollowMyHealth portal, you will also be able to view your health information using other applications (apps) compatible with our system.

## 2025-06-10 NOTE — ED PROVIDER NOTE - PROGRESS NOTE DETAILS
GYN reviewed U/S and saw the patient and would like outpt f/u in 48 hours. labs nonactionable. Patient understanding of return precautions and that she should follow up with PCP in 48 hours. Amenable with plan for discharge.

## 2025-06-10 NOTE — CONSULT NOTE ADULT - SUBJECTIVE AND OBJECTIVE BOX
**INCOMPLETE NOTE**    SOTERO CAUSEY  29y  Female [id number]    HPI:        Name of GYN Physician:   OBHx:    GynHx: Denies fibroids, cysts, endometriosis, STI's, Abnormal pap smears   PMH:  PSH:  Meds:  Allx:  Social History:  Denies smoking use, drug use, alcohol use.   +occasional social alcohol use    Vital Signs Last 24 Hrs  T(C): 36.9 (09 Jun 2025 22:04), Max: 37.1 (09 Jun 2025 18:06)  T(F): 98.5 (09 Jun 2025 22:04), Max: 98.8 (09 Jun 2025 18:06)  HR: 69 (09 Jun 2025 22:04) (69 - 78)  BP: 119/78 (09 Jun 2025 22:04) (119/78 - 120/76)  BP(mean): 91 (09 Jun 2025 22:04) (91 - 91)  RR: 16 (09 Jun 2025 22:04) (16 - 18)  SpO2: 98% (09 Jun 2025 22:04) (98% - 99%)    Parameters below as of 09 Jun 2025 22:04  Patient On (Oxygen Delivery Method): room air        Physical Exam:   General: sitting comfortably in bed, NAD   HEENT: neck supple, full ROM  CV: RRR  Lungs: CTA b/l, good air flow b/l   Back: No CVA tenderness  Abd: Soft, non-tender, non-distended.  Bowel sounds present.    :  No bleeding on pad.    External labia wnl.  Bimanual exam with no cervical motion tenderness, uterus wnl, adnexa non palpable b/l.  Cervix closed vs. Cervix dilated  Speculum Exam: No active bleeding from os.  Physiologic discharge.    Ext: non-tender b/l, no edema     LABS:                              11.1   8.43  )-----------( 268      ( 10 Ralph 2025 00:30 )             34.1     06-10    137  |  104  |  14  ----------------------------<  107[H]  3.8   |  20[L]  |  0.60    Ca    8.9      10 Ralph 2025 00:30    TPro  6.9  /  Alb  4.3  /  TBili  0.1[L]  /  DBili  x   /  AST  13  /  ALT  9[L]  /  AlkPhos  80  06-10    I&O's Detail      Urinalysis Basic - ( 10 Ralph 2025 00:30 )    Color: x / Appearance: x / SG: x / pH: x  Gluc: 107 mg/dL / Ketone: x  / Bili: x / Urobili: x   Blood: x / Protein: x / Nitrite: x   Leuk Esterase: x / RBC: x / WBC x   Sq Epi: x / Non Sq Epi: x / Bacteria: x        RADIOLOGY & ADDITIONAL STUDIES:     SOTERO CAUSEY  29y  Female [id number]    HPI:  28 y/o  LMP  sent in by primary GYN due to abnormal bHCG trend. Patient was seen in the office on  due to vaginal bleeding and +UPT, was found to have bHCG of 69. Patient states that she had a repeat that was 56 and was subsequently told to come to ED for repeat labwork and imaging. She denies any abdominal pain. She denies lightheadedness/dizziness. She denies CP/SOB. She is having mild vaginal spotting.       Name of GYN Physician: [facility]  OBHx:  C/S x1  GynHx: Denies fibroids, cysts, endometriosis, STI's, Abnormal pap smears   PMH: denies  PSH: C/S  Meds: PNV  Allx: NKDA  Social History:  Denies smoking use, drug use, alcohol use.       Vital Signs Last 24 Hrs  T(C): 36.9 (2025 22:04), Max: 37.1 (2025 18:06)  T(F): 98.5 (2025 22:04), Max: 98.8 (2025 18:06)  HR: 69 (2025 22:04) (69 - 78)  BP: 119/78 (2025 22:04) (119/78 - 120/76)  BP(mean): 91 (2025 22:04) (91 - 91)  RR: 16 (2025 22:04) (16 - 18)  SpO2: 98% (2025 22:04) (98% - 99%)    Parameters below as of 2025 22:04  Patient On (Oxygen Delivery Method): room air        Physical Exam:   General: sitting comfortably in bed, NAD   HEENT: neck supple, full ROM  CV: well perfused  Lungs: nonlabored respirations  Abd: Soft, non-tender, non-distended.    :  Minimal bleeding on pad.    LABS:                              11.1   8.43  )-----------( 268      ( 10 Ralph 2025 00:30 )             34.1     06-10    137  |  104  |  14  ----------------------------<  107[H]  3.8   |  20[L]  |  0.60    Ca    8.9      10 Ralph 2025 00:30    TPro  6.9  /  Alb  4.3  /  TBili  0.1[L]  /  DBili  x   /  AST  13  /  ALT  9[L]  /  AlkPhos  80  06-10    I&O's Detail      Urinalysis Basic - ( 10 Ralph 2025 00:30 )    Color: x / Appearance: x / SG: x / pH: x  Gluc: 107 mg/dL / Ketone: x  / Bili: x / Urobili: x   Blood: x / Protein: x / Nitrite: x   Leuk Esterase: x / RBC: x / WBC x   Sq Epi: x / Non Sq Epi: x / Bacteria: x        RADIOLOGY & ADDITIONAL STUDIES:      ACC: 1995 EXAM:  US DPLX PELVIC   ORDERED BY: PAVITHRA MARTÍNEZ     ACC: [id number] EXAM:  US OB TRANSVAGINAL   ORDERED BY: PAVITHRA MARTÍNEZ     PROCEDURE DATE:  06/10/2025          INTERPRETATION:  CLINICAL INFORMATION: Pregnant.  Reported miscarriage on   2025.  Intermittent vaginal bleeding since 2025.  Evaluate   for ectopic pregnancy.  Beta HCG 26.1  LMP: 2025    Estimated Gestational Age by LMP: 8 weeks 1 day    COMPARISON: None available.    TECHNIQUE: Endovaginal and transabdominal pelvic sonogram. Color and   Spectral Doppler was performed.    FINDINGS:  Uterus: 8.2 x 2.8 x 4.8 cm.  No evidence of intrauterine gestational sac.    The endometrium measures 6 mm in thickness.    Right ovary: 3.2 cm x 1.8 cm x 2.2 cm. Multiple follicles.  A dominant   follicle measures 1.8 x 1.6 x 2.0 cm.  There is a simple appearing right   adnexal extraovarian cyst measuring 1.1 x 0.7 x 1.1 cm.  No mass or   torsion. Normal arterial and venous waveforms.  Left ovary: 3.2 cm x 1.7 cm x 2.1 cm. Multiple follicles measuring up to   8 mm.  No mass or torsion. Normal arterial and venous waveforms.    Fluid: None.    IMPRESSION:  Pregnancy of unknown location.    The differential diagnosis includes early intrauterine pregnancy,   completed , and nonvisualized ectopic.    Correlation with serial beta hCG levels is recommended, with short-term   interval follow-up pelvic ultrasound as clinically warranted.        --- End of Report ---          TIFFANIE LANE DO; Resident Radiologist  This document has been electronically signed.  MONICA LEON MD; Attending Radiologist  This document has been electronically signed. Ralph 10 2025  1:55AM

## 2025-06-10 NOTE — ED PROVIDER NOTE - PHYSICAL EXAMINATION
Gen: No acute distress  HEENT: EOMI, no nasal discharge, mucous membranes moist  CV: RRR, +S1/S2, no M/R/G, 2+ radial pulses b/l  Resp: CTAB, no W/R/R, no accessory muscle use, no increased work of breathing  GI: Abdomen soft non-distended, NTTP  MSK: No open wounds, no bruising, no LE edema  Neuro: A&Ox3, following commands, moving all four extremities spontaneously  Psych: appropriate mood

## 2025-06-10 NOTE — ED ADULT NURSE NOTE - NS_SISCREENINGSR_GEN_ALL_ED
09/25/23 1500   Group Therapy Session   Time Session Began 1330   Time Session Ended 1500   Total Time (minutes) 45   Total # Attendees 2   Group Type expressive therapy   Group Topic Covered balanced lifestyle;emotions/expression;relaxation techniques   Group Session Detail Relaxation   Patient Response/Contribution cooperative with task   Patient Participation Detail Engaged in extended Music Relaxation group to decrease anxiety and promote feelings of validation and well-being.       Vinod was able to recall a series of some from the Beatles to the Batman theme that were comforting to him.  He also engaged in playing a [employer] rhythm percussion instrument and was right on the beat in that.  Music Therapist complimented him on his rhythm.  He appears to have delayed responses at times, as if experiencing a slower processing speed.  Pleasant, upbeat.  Hands tremble, but he was still able to play the hand held instrument without issue.  He seemed comfortable in the space, as if not wanting to leave when group was over, but needed to leave as staff were going to be coming in for report.              Negative

## 2025-06-10 NOTE — ED PROVIDER NOTE - CARE PLAN
1 Principal Discharge DX:	Female pelvic pain   Principal Discharge DX:	Pregnancy of unknown anatomic location

## 2025-06-10 NOTE — CONSULT NOTE ADULT - ASSESSMENT
A/P: 28 y/o  LMP  sent in by primary GYN due to abnormal bHCG trend. bHCG today 26.1, with no evidence of IUP or ectopic pregnancy on TVUS. Discussed findings with patient. Discussed that this pregnancy is likely consistent with SAB but ectopic pregnancy is still on differential. Discussed importance of close follow-up with repeat HCG level in 48 hours. Discussed ectopic precautions.    - f/u HCG level in 48 hours. Patient to get repeat bHCG at [facility].  - ectopic precautions reviewed with patient  - Rh+, no Rhogam indicated at this time    seen w/ Claire Blackmon PGY2

## 2025-06-10 NOTE — ED ADULT NURSE NOTE - OBJECTIVE STATEMENT
29 year old female coming to ED for vaginal spotting sent in my obgyn. A&Ox4. Ambulatory independently. No significant PMH. . LMP 25. Patient states she began bleeding vaginally on 25 and her doctor told her she was having a miscarriage. Patient states she began spotting over the last week so she went to obgyn today who stated her HCG levels were increasing. Patient endorsing intermittent mild lower abdominal pain. Pt denies chest pain, SOB, N/V/D, fever/chills, HA/vision changes or GI/ symptoms. Patient breathing spontaneously and unlabored.

## 2025-06-10 NOTE — ED PROVIDER NOTE - TOBACCO USE
Unknown if ever smoked Regular rate and rhythm, Heart sounds S1 S2 present, no murmurs, rubs or gallops

## 2025-06-10 NOTE — ED PROVIDER NOTE - NSFOLLOWUPINSTRUCTIONS_ED_ALL_ED_FT
You were seen and evaluated in the emergency department due to your pregnancy concerns.  Upon evaluation here no emergent findings were discovered and we feel you can be safely discharged at this time to follow-up with your OB/GYN.  The results of your visit here can be seen in this packet.  He should bring this with you when you follow-up.  You should follow-up with your OB/GYN within the next 48 hours. Please call to set up an appointment.     Please return to the emergency department seek medical attention immediately if you have any of the following symptoms.  These include but are not limited to vaginal bleeding, abdominal pain, painful urination, lightheadedness, dizziness, fevers, uncontrollable vomiting.

## 2025-06-10 NOTE — ED PROVIDER NOTE - OBJECTIVE STATEMENT
see mdm 29-year-old female no significant past medical history presents emergency department due to concern for ectopic pregnancy versus miscarriage.  Patient FDLMP 4/13.  Seen by OB Dr. Landers 5/27 with hCG of 69.  Had repeat testing after this but is unsure of what the value was.  She has never had pregnancy confirmed in uterus.  Had vaginal bleeding starting approximately 5/23 lasting for a couple of days but has subsided.  Now has intermittent brown discharge but not currently.  Denies fever, chills, nausea, vomiting shortness of breath, chest pain.  No abdominal pain or urinary symptoms. Sent in for repeat labs to trend HCG

## 2025-06-10 NOTE — ED PROVIDER NOTE - CLINICAL SUMMARY MEDICAL DECISION MAKING FREE TEXT BOX
29-year-old female no significant past medical history presents emergency department due to concern for ectopic pregnancy versus miscarriage.  Patient FDLMP .  Seen by OB Dr. Landers  with hCG of 69.  Had repeat testing after this but is unsure of what the value was.  She has never had pregnancy confirmed in uterus.  Had vaginal bleeding starting approximately  lasting for a couple of days but has subsided.  Now has intermittent brown discharge but not currently.  Denies fever, chills, nausea, vomiting shortness of breath, chest pain.  No abdominal pain or urinary symptoms.    No acute distress and nontoxic-appearing.  Hemodynamically stable and afebrile.  No abdominal tenderness to palpation.  Concern for spontaneous  versus ectopic pregnancy.  Will check with transvaginal ultrasound and hCG.  Will get labs and type and screen since cannot see type and screen in system.  Will most likely reach GYN pending ultrasound. 29-year-old female no significant past medical history presents emergency department due to concern for ectopic pregnancy versus miscarriage.  Patient FDLMP .  Seen by OB Dr. Landers  with hCG of 69.  Had repeat testing after this but is unsure of what the value was.  She has never had pregnancy confirmed in uterus.  Had vaginal bleeding starting approximately  lasting for a couple of days but has subsided.  Now has intermittent brown discharge but not currently.  Denies fever, chills, nausea, vomiting shortness of breath, chest pain.  No abdominal pain or urinary symptoms.    No acute distress and nontoxic-appearing.  Hemodynamically stable and afebrile.  No abdominal tenderness to palpation.  Concern for spontaneous  versus ectopic pregnancy.  Will check with transvaginal ultrasound and hCG.  Will get labs and type and screen since cannot see type and screen in system.  Will most likely reach GYN pending ultrasound.    Mabel Espinoza MD - Attending Physician: Pt here with +HCG, vag bleeding and intermittent pelvic pain sent in for repeat eval. Initially seen  with low HCG. No confirmed IUP. +VB that has resolved except for mild dc now. HCG initially increasing. Today with minimal pelvic discomfort, no dizziness, no active bleeding. US and Labs for eval

## 2025-06-12 ENCOUNTER — EMERGENCY (EMERGENCY)
Facility: HOSPITAL | Age: 30
LOS: 1 days | End: 2025-06-12
Attending: STUDENT IN AN ORGANIZED HEALTH CARE EDUCATION/TRAINING PROGRAM
Payer: COMMERCIAL

## 2025-06-12 VITALS
RESPIRATION RATE: 20 BRPM | SYSTOLIC BLOOD PRESSURE: 104 MMHG | HEART RATE: 73 BPM | WEIGHT: 169.98 LBS | DIASTOLIC BLOOD PRESSURE: 71 MMHG | OXYGEN SATURATION: 98 % | TEMPERATURE: 99 F | HEIGHT: 60 IN

## 2025-06-12 VITALS
HEART RATE: 72 BPM | DIASTOLIC BLOOD PRESSURE: 70 MMHG | SYSTOLIC BLOOD PRESSURE: 116 MMHG | OXYGEN SATURATION: 99 % | TEMPERATURE: 99 F | RESPIRATION RATE: 18 BRPM

## 2025-06-12 LAB
ALBUMIN SERPL ELPH-MCNC: 4.1 G/DL — SIGNIFICANT CHANGE UP (ref 3.3–5)
ALP SERPL-CCNC: 67 U/L — SIGNIFICANT CHANGE UP (ref 40–120)
ALT FLD-CCNC: 9 U/L — LOW (ref 10–45)
ANION GAP SERPL CALC-SCNC: 13 MMOL/L — SIGNIFICANT CHANGE UP (ref 5–17)
AST SERPL-CCNC: 14 U/L — SIGNIFICANT CHANGE UP (ref 10–40)
BILIRUB SERPL-MCNC: 0.4 MG/DL — SIGNIFICANT CHANGE UP (ref 0.2–1.2)
BUN SERPL-MCNC: 8 MG/DL — SIGNIFICANT CHANGE UP (ref 7–23)
CALCIUM SERPL-MCNC: 9.1 MG/DL — SIGNIFICANT CHANGE UP (ref 8.4–10.5)
CHLORIDE SERPL-SCNC: 102 MMOL/L — SIGNIFICANT CHANGE UP (ref 96–108)
CO2 SERPL-SCNC: 22 MMOL/L — SIGNIFICANT CHANGE UP (ref 22–31)
CREAT SERPL-MCNC: 0.61 MG/DL — SIGNIFICANT CHANGE UP (ref 0.5–1.3)
EGFR: 122 ML/MIN/1.73M2 — SIGNIFICANT CHANGE UP
EGFR: 122 ML/MIN/1.73M2 — SIGNIFICANT CHANGE UP
GLUCOSE SERPL-MCNC: 93 MG/DL — SIGNIFICANT CHANGE UP (ref 70–99)
HCG SERPL-ACNC: 42.1 MIU/ML — HIGH
HCT VFR BLD CALC: 33.2 % — LOW (ref 34.5–45)
HGB BLD-MCNC: 10.6 G/DL — LOW (ref 11.5–15.5)
MCHC RBC-ENTMCNC: 28.2 PG — SIGNIFICANT CHANGE UP (ref 27–34)
MCHC RBC-ENTMCNC: 31.9 G/DL — LOW (ref 32–36)
MCV RBC AUTO: 88.3 FL — SIGNIFICANT CHANGE UP (ref 80–100)
NRBC BLD AUTO-RTO: 0 /100 WBCS — SIGNIFICANT CHANGE UP (ref 0–0)
PLATELET # BLD AUTO: 239 K/UL — SIGNIFICANT CHANGE UP (ref 150–400)
POTASSIUM SERPL-MCNC: 3.8 MMOL/L — SIGNIFICANT CHANGE UP (ref 3.5–5.3)
POTASSIUM SERPL-SCNC: 3.8 MMOL/L — SIGNIFICANT CHANGE UP (ref 3.5–5.3)
PROT SERPL-MCNC: 6.7 G/DL — SIGNIFICANT CHANGE UP (ref 6–8.3)
RBC # BLD: 3.76 M/UL — LOW (ref 3.8–5.2)
RBC # FLD: 13.5 % — SIGNIFICANT CHANGE UP (ref 10.3–14.5)
SODIUM SERPL-SCNC: 137 MMOL/L — SIGNIFICANT CHANGE UP (ref 135–145)
WBC # BLD: 6.97 K/UL — SIGNIFICANT CHANGE UP (ref 3.8–10.5)
WBC # FLD AUTO: 6.97 K/UL — SIGNIFICANT CHANGE UP (ref 3.8–10.5)

## 2025-06-12 PROCEDURE — 80053 COMPREHEN METABOLIC PANEL: CPT

## 2025-06-12 PROCEDURE — 96401 CHEMO ANTI-NEOPL SQ/IM: CPT

## 2025-06-12 PROCEDURE — 76817 TRANSVAGINAL US OBSTETRIC: CPT

## 2025-06-12 PROCEDURE — 99285 EMERGENCY DEPT VISIT HI MDM: CPT | Mod: 25

## 2025-06-12 PROCEDURE — 99285 EMERGENCY DEPT VISIT HI MDM: CPT

## 2025-06-12 PROCEDURE — 84702 CHORIONIC GONADOTROPIN TEST: CPT

## 2025-06-12 PROCEDURE — 93975 VASCULAR STUDY: CPT | Mod: 26

## 2025-06-12 PROCEDURE — 76817 TRANSVAGINAL US OBSTETRIC: CPT | Mod: 26

## 2025-06-12 PROCEDURE — 93975 VASCULAR STUDY: CPT

## 2025-06-12 PROCEDURE — 85027 COMPLETE CBC AUTOMATED: CPT

## 2025-06-12 RX ORDER — METHOTREXATE 25 MG/ML
87.5 INJECTION, SOLUTION INTRA-ARTERIAL; INTRAMUSCULAR; INTRATHECAL; INTRAVENOUS ONCE
Refills: 0 | Status: ACTIVE | OUTPATIENT
Start: 2025-06-12 | End: 2025-06-12

## 2025-06-12 RX ORDER — METHOTREXATE 25 MG/ML
87.5 INJECTION, SOLUTION INTRA-ARTERIAL; INTRAMUSCULAR; INTRATHECAL; INTRAVENOUS ONCE
Refills: 0 | Status: COMPLETED | OUTPATIENT
Start: 2025-06-12 | End: 2025-06-12

## 2025-06-12 RX ORDER — METHOTREXATE 25 MG/ML
87.5 INJECTION, SOLUTION INTRA-ARTERIAL; INTRAMUSCULAR; INTRATHECAL; INTRAVENOUS ONCE
Refills: 0 | Status: DISCONTINUED | OUTPATIENT
Start: 2025-06-12 | End: 2025-06-12

## 2025-06-12 RX ADMIN — METHOTREXATE 87.5 MILLIGRAM(S): 25 INJECTION, SOLUTION INTRA-ARTERIAL; INTRAMUSCULAR; INTRATHECAL; INTRAVENOUS at 20:21

## 2025-06-12 NOTE — ED ADULT TRIAGE NOTE - CHIEF COMPLAINT QUOTE
Seen here Monday, Uptrending HCG was 22, now 48. Sent in to r/o ectopic. Sent in for methotrexate. Dr. Tariq

## 2025-06-12 NOTE — ED PROVIDER NOTE - PATIENT PORTAL LINK FT
You can access the FollowMyHealth Patient Portal offered by [facility] by registering at the following website: [email or web address]. By joining [facility]’s FollowMyHealth portal, you will also be able to view your health information using other applications (apps) compatible with our system.

## 2025-06-12 NOTE — ED ADULT NURSE NOTE - OBJECTIVE STATEMENT
28 y/o female  presenting to ED for abnormal HCG levels.  LMP , May 23 started to have vaginal bleeding  for miscarriage, pt reports it was confirmed on may 27th, pt reports that she has no fevers, no chills, no cp, no sob, no lightheadedness, she states that on rustam 3 beta of 70 and then rustam 10 beta of 23, yesterday had beta checked and was 45. pt w/ spotting since may 23. pt aox4 breathing even unlabored spontaneously, ambulatory.

## 2025-06-12 NOTE — ED PROVIDER NOTE - NSFOLLOWUPINSTRUCTIONS_ED_ALL_ED_FT
We evaluated you for vaginal bleeding today. We checked your hemoglobin and you do not need a blood transfusion at this time. Your situation can change quickly. If you develop bleeding that you are soaking through more than 2 pads per hour for 2 hours, if you develop lightheadedness/dizziness/feeling like you will pass out, chest pain, shortness of breath please return to the emergency room. We recommend that you make an appointment with your obstetrician-gynecologist for repeat evaluation and to ensure the symptoms are improving. We evaluated you for vaginal bleeding today. You were given Methotrexate today.       Please return on Day 4 (6/15) and day 7 (6/18) for repeat blood work.     Call us right away if you:  Have severe abdominal pain that keeps getting worse  Are bleeding enough to soak a maxi pad each hour for 2 hours straight    What is methotrexate?  It is a medicine that can be used to treat an ectopic pregnancy. It stops certain cells from dividing by interfering with the folic acid in your body.      It is given by injection. You often only need 1 dose. Sometimes people will need a second dose to fully treat the ectopic pregnancy.    How often will I need follow up?  Day 1. You will have your hCG level, blood type and other tests done. You will talk with your provider. You will get the methotrexate injection.  Day 4. You will have your hCG level drawn. Your hCG may rise on this day. This is normal.  Day 7. You will have several labs drawn, including hCG. You will see your provider to talk about your lab results and symptoms. Your hCG should drop by at least 15% from your day 4 level.  You will get your hCG level drawn every 7 days until your blood pregnancy test is negative.  Very important instructions for you to follow:  Do not:  Do not take any folic acid or prenatal vitamins. Stop taking all vitamins and supplements unless directed by your provider. This can be separately or in your multivitamin or prenatal vitamin. These lower the success of the methotrexate.  Do not take any form of progesterone.  Do not take any non-steroidal anti-inflammatory medicine for the next 10 days, such as:  Advil®  Aleve®  Ibuprofen  Motrin®  Orudis®  Do not lay in the sun or a tanning bed for at least 7 days.  Do not drink alcohol until your hCG level is negative.  Do not have sexual intercourse for at least 2 weeks after your last injection. Talk with your doctor before having sex again.  Use condoms or other birth control until your hCG is negative and you want to try to get pregnant again.    It is best to wait 3 months after methotrexate injection to try to get pregnant again. Talk with your provider about future pregnancy plans.       What happens after I get methotrexate?  Mild to moderate cramping for 3 to 7 days  Take your pain medicine as prescribed.  Light vaginal bleeding  Your period may not be normal.  It may last longer than normal.  You may pass some gray-pink tissue called a “cast” from the uterus. This is normal.  What are the side effects?  Common side effects are:  Feeling tired  Abdominal pain or cramping  Nausea and/or vomiting for 24 hours  Upset stomach, decreased appetite  Diarrhea  Sores in your mouth  Headache  Redness, swelling, or pain at the injection site  Having trouble sleeping  Rare side effects are:  Hair loss  Bone marrow suppression  Pneumonitis (inflammation of the lungs)  Pleuritis  High liver enzymes  Dermatitis

## 2025-06-12 NOTE — ED PROVIDER NOTE - PROGRESS NOTE DETAILS
Abisai Alfonso MD (Attending MD): Patient pending methotrexate. informed. Abisai Alfonso MD (Attending MD): Seen by OB resident, requesting d/c TVUS Abisai Alfonso MD (Attending MD): Per TEAMS, ob resident requesting TVUS pending methotrexate. Patient informed Abisai Alfonso MD (Attending MD): Patient doing well after methotrexate. discussed return rpecautions.

## 2025-06-12 NOTE — CONSULT NOTE ADULT - SUBJECTIVE AND OBJECTIVE BOX
SOTERO CAUSEY  29y  Female [id number]    HPI:  Pt is a 30yo  LMP 25 p/w vaginal spotting and abdominal pain iso persistently elevated beta-HCG levels. Pt states she experienced symptoms of SAB including cramping and heavy vaginal bleeding soaking 3-4 pads/day starting . Beta-HCG  was 69. Patient states that she had a repeat beta-HCG of 56 prior to being seen in the [facility] ED on 6/10. TVUS on 6/10 was suggestive of PUL w/no evidence of IUP or ectopic pregnancy. She was sent from outpt office today for beta-HCG of 45 per pt. She endorses vaginal spotting since the episode of presumed SAB on . She states she feels sharp abdominal pain intermittently, rated 6/10, localized to her mid-lower abdomen. She states the pain is unlike cramping that she occasionally experiences during her cycle. She denies dysuria.    Name of GYN Physician: [facility]  OBHx:  C/S x1  GynHx: Denies fibroids, cysts, endometriosis, STI's, Abnormal pap smears   PMH: denies  PSH: C/S  Meds: PNV  Allx: NKDA  Social History:  Denies smoking use, drug use, alcohol use.       Vital Signs Last 24 Hrs  T(C): 37 (2025 13:32), Max: 37 (2025 13:32)  T(F): 98.6 (2025 13:32), Max: 98.6 (2025 13:32)  HR: 73 (2025 13:32) (73 - 73)  BP: 104/71 (2025 13:32) (104/71 - 104/71)  BP(mean): --  RR: 20 (2025 13:32) (20 - 20)  SpO2: 98% (2025 13:32) (98% - 98%)    Parameters below as of 2025 13:32  Patient On (Oxygen Delivery Method): room air      Physical Exam:   General: sitting comfortably in bed, NAD   HEENT: neck supple, full ROM  CV: Well perfused on exam  Lungs: Saturating well on RA  Abd: Tender to deep palpation over mid to lower abd, rated 6/10; Soft, non-distended.   : No vaginal bleeding.      LABS:                              10.6   6.97  )-----------( 239      ( 2025 15:25 )             33.2         137  |  102  |  8   ----------------------------<  93  3.8   |  22  |  0.61    Ca    9.1      2025 15:25    TPro  6.7  /  Alb  4.1  /  TBili  0.4  /  DBili  x   /  AST  14  /  ALT  9[L]  /  AlkPhos  67  06-12    I&O's Detail      Urinalysis Basic - ( 2025 15:25 )    Color: x / Appearance: x / SG: x / pH: x  Gluc: 93 mg/dL / Ketone: x  / Bili: x / Urobili: x   Blood: x / Protein: x / Nitrite: x   Leuk Esterase: x / RBC: x / WBC x   Sq Epi: x / Non Sq Epi: x / Bacteria: x

## 2025-06-12 NOTE — ED PROVIDER NOTE - CLINICAL SUMMARY MEDICAL DECISION MAKING FREE TEXT BOX
gyn c/s for vaginal bleeding in setting up trending beta, s/p miscarriage pt hd stable to have repeat beta discussion w/ gyn regarding imaging

## 2025-06-12 NOTE — ED PROVIDER NOTE - ADDITIONAL NOTES AND INSTRUCTIONS:
Renetta Guzman was seen in the Emergency Department on 6/12/2025. Based on her prognosis, I highly recommend that patient not travel within the next week. I recommend that her trip on 6/13/25 to 6/16/2025 be rescheduled as her medical condition necessitates this. Please help Ms. Guzman reschedule this trip. If you need more information, you may email Dr. Abisai Alfonso ([email or web address]).

## 2025-06-12 NOTE — ED PROVIDER NOTE - IV ALTEPLASE ADMIN OUTSIDE HIDDEN
show Silver Nitrate Text: The wound bed was treated with silver nitrate after the biopsy was performed.

## 2025-06-12 NOTE — CONSULT NOTE ADULT - ASSESSMENT
A/P:  28yo  LMP 25 p/w vaginal spotting and abdominal pain iso persistently elevated beta-HCG levels. Office beta-HCG 45 per pt, 42.1 in ED. Pt endorses intermittent, unprovoked abd pain at rest, currently without vaginal bleeding. Given unclear etiology of abdominopelvic pain and persistently elevated beta-HCG, recommend TVUS to r/o ectopic pregnancy.    Recommendations:  - Consider MTX pending TVUS; CMP for creatinine/LFTs; discussed risks/benefits/alternatives w/pt  - Tylenol PRN    Jas Ramirez PGY1

## 2025-06-12 NOTE — ED PROVIDER NOTE - SHIFT CHANGE DETAILS
I have received sign out on this patient. I am aware of the plan of care and what studies are pending from the previous provider. I have been available for supervision of the predetermined care and intervention if the plan should change.     Pending TVUS, OB recs

## 2025-06-12 NOTE — ED PROVIDER NOTE - OBJECTIVE STATEMENT
29 F , LMP april 13, May 23 started to have vaginal bleeding  for miscarriage, pt reports it was confirmed on may 27th, pt reports that she has no fevers, no chills, no cp, no sob, no lightheadedness, she states that on rustam 3 beta  and then rustam 10 beta of 23, yesterday had beta checked and was 45. pt w/ spotting since may 23.

## 2025-06-12 NOTE — CONSULT NOTE ADULT - ATTENDING COMMENTS
Pregnancy of unknown location, likely ectopic vs non-viable IUP.    Patient clinically and hemodynamically stable with no signs of rupture on imaging.  Patient asymptomatic with benign abdomen. Patient is candidate for treatment with methotrexate therapy.   Patient's labs and imaging were reviewed and patient was counseled on options for treatment of possible ectopic pregnancy (vs nonviable IUP) with methotrexate.   Patient opts for methotrexate therapy.  Discussed risks/benefits at length (including, but not limited to, bleeding, rupture, emergent surgery, need for additional medication, etc) and consents signed. Pt will be for blood work on day 4 and 7.    MD Live